# Patient Record
Sex: FEMALE | Race: WHITE
[De-identification: names, ages, dates, MRNs, and addresses within clinical notes are randomized per-mention and may not be internally consistent; named-entity substitution may affect disease eponyms.]

---

## 2017-07-28 ENCOUNTER — HOSPITAL ENCOUNTER (OUTPATIENT)
Dept: HOSPITAL 17 - PLAB | Age: 82
End: 2017-07-28
Attending: INTERNAL MEDICINE
Payer: MEDICARE

## 2017-07-28 DIAGNOSIS — I10: ICD-10-CM

## 2017-07-28 DIAGNOSIS — E78.2: ICD-10-CM

## 2017-07-28 DIAGNOSIS — Z79.899: ICD-10-CM

## 2017-07-28 DIAGNOSIS — I65.29: ICD-10-CM

## 2017-07-28 DIAGNOSIS — I31.9: ICD-10-CM

## 2017-07-28 DIAGNOSIS — R94.39: Primary | ICD-10-CM

## 2017-07-28 DIAGNOSIS — I36.9: ICD-10-CM

## 2017-07-28 DIAGNOSIS — I05.9: ICD-10-CM

## 2017-07-28 DIAGNOSIS — R00.0: ICD-10-CM

## 2017-07-28 LAB
ALP SERPL-CCNC: 55 U/L (ref 45–117)
ALT SERPL-CCNC: 18 U/L (ref 10–53)
AST SERPL-CCNC: 14 U/L (ref 15–37)
BILIRUB INDIRECT SERPL-MCNC: 0.5 MG/DL (ref 0–0.8)
BILIRUB SERPL-MCNC: 0.6 MG/DL (ref 0.2–1)
CK SERPL-CCNC: 31 U/L (ref 26–192)
HDLC SERPL-MCNC: 106.5 MG/DL (ref 40–60)
LDLC SERPL-MCNC: 173 MG/DL (ref 0–99)

## 2017-07-28 PROCEDURE — 80061 LIPID PANEL: CPT

## 2017-07-28 PROCEDURE — 80076 HEPATIC FUNCTION PANEL: CPT

## 2017-07-28 PROCEDURE — 36415 COLL VENOUS BLD VENIPUNCTURE: CPT

## 2017-07-28 PROCEDURE — 82550 ASSAY OF CK (CPK): CPT

## 2017-09-05 ENCOUNTER — HOSPITAL ENCOUNTER (OUTPATIENT)
Dept: HOSPITAL 17 - PLAB | Age: 82
End: 2017-09-05
Attending: FAMILY MEDICINE
Payer: MEDICARE

## 2017-09-05 DIAGNOSIS — E55.9: ICD-10-CM

## 2017-09-05 DIAGNOSIS — E78.2: Primary | ICD-10-CM

## 2017-09-05 LAB
ALP SERPL-CCNC: 49 U/L (ref 45–117)
ALT SERPL-CCNC: 19 U/L (ref 10–53)
ANION GAP SERPL CALC-SCNC: 6 MEQ/L (ref 5–15)
AST SERPL-CCNC: 9 U/L (ref 15–37)
BILIRUB SERPL-MCNC: 0.4 MG/DL (ref 0.2–1)
BUN SERPL-MCNC: 20 MG/DL (ref 7–18)
CHLORIDE SERPL-SCNC: 108 MEQ/L (ref 98–107)
GFR SERPLBLD BASED ON 1.73 SQ M-ARVRAT: 72 ML/MIN (ref 89–?)
HCO3 BLD-SCNC: 27.6 MEQ/L (ref 21–32)
HDLC SERPL-MCNC: 102.4 MG/DL (ref 40–60)
LDLC SERPL-MCNC: 102 MG/DL (ref 0–99)
POTASSIUM SERPL-SCNC: 4.2 MEQ/L (ref 3.5–5.1)
SODIUM SERPL-SCNC: 142 MEQ/L (ref 136–145)

## 2017-09-05 PROCEDURE — 36415 COLL VENOUS BLD VENIPUNCTURE: CPT

## 2017-09-05 PROCEDURE — 80053 COMPREHEN METABOLIC PANEL: CPT

## 2017-09-05 PROCEDURE — 80061 LIPID PANEL: CPT

## 2017-09-05 PROCEDURE — 82652 VIT D 1 25-DIHYDROXY: CPT

## 2017-10-02 ENCOUNTER — HOSPITAL ENCOUNTER (EMERGENCY)
Dept: HOSPITAL 17 - PHED | Age: 82
Discharge: HOME | End: 2017-10-02
Payer: MEDICARE

## 2017-10-02 VITALS
HEART RATE: 69 BPM | RESPIRATION RATE: 16 BRPM | DIASTOLIC BLOOD PRESSURE: 56 MMHG | OXYGEN SATURATION: 97 % | TEMPERATURE: 97.6 F | SYSTOLIC BLOOD PRESSURE: 122 MMHG

## 2017-10-02 VITALS — HEIGHT: 64 IN | WEIGHT: 112.44 LBS | BODY MASS INDEX: 19.2 KG/M2

## 2017-10-02 DIAGNOSIS — M54.31: Primary | ICD-10-CM

## 2017-10-02 DIAGNOSIS — I10: ICD-10-CM

## 2017-10-02 PROCEDURE — 99283 EMERGENCY DEPT VISIT LOW MDM: CPT

## 2017-10-02 NOTE — PD
HPI


Chief Complaint:  Musculoskeletal Complaint


Time Seen by Provider:  11:14


Travel History


International Travel<30 days:  No


Contact w/Intl Traveler<30days:  No


Traveled to known affect area:  No





History of Present Illness


HPI


The patient was seen and examined in the presence of the nurse.  She complains 

of right hip pain.  However it is actually her right buttock that is hurting.  

It's been hurting every day for 4 months.  She takes Advil for it which helps 

but she doesn't want to take Advil long-term.  She saw her primary physician 

for this who did x-rays.  She says they were normal.  She has no injury.  She 

is ambulatory.  Symptoms severity is mild to moderate





PFSH


Past Medical History


Hypertension:  Yes


Pregnant?:  Not Pregnant


Menopausal:  Yes





Past Surgical History


Surgical History:  No Previous Surgery


Hysterectomy:  Yes





Social History


Alcohol Use:  Yes (RARELY)


Tobacco Use:  No


Substance Use:  No





Allergies-Medications


(Allergen,Severity, Reaction):  


Coded Allergies:  


     No Known Allergies (Unverified , 10/2/17)


Reported Meds & Prescriptions





Reported Meds & Active Scripts


Active


Reported


Ibuprofen 400 Mg Tab 400 Mg PO TID


Vitamin D (Ergocalciferol (Vitamin D2)) 400 Unit Tablet 500 Mg PO DAILY


Atorvastatin (Atorvastatin Calcium) 10 Mg Tab 10 Mg PO HS


Verapamil ER 24 HR (Verapamil HCl) 240 Mg Tab 240 Mg PO HS


Levothyroxine (Levothyroxine Sodium) 75 Mcg Tab 75 Mcg PO DAILY


Potassium Chloride ER (Potassium Chloride) 8 Meq Tab 8 Meq PO DAILY


Hydrochlorothiazide 12.5 Mg Cap 12.5 Mg PO DAILY


Metoprolol Succinate ER 24 HR (Metoprolol Succinate) 25 Mg Tab 12.5 Mg PO DAILY


Premarin (Estrogens, Conjugated) 0.45 Mg Tab 0.45 Mg PO DAILY








Review of Systems


General / Constitutional:  No: Fever


HENT:  No: Headaches


Cardiovascular:  No: Chest Pain or Discomfort


Respiratory:  No: Cough





Physical Exam


Narrative


GASTROINTESTINAL:  Abdomen soft, non-tender, nondistended. Positive bowel 

sounds.  No hepato-splenomegaly, or palpable masses. No guarding.





NEUROLOGICAL: Awake and alert. Pupils are equal round and reactive. Motor and 

sensory grossly within normal limits. Five out of 5 muscle strength in all 

muscle groups.  Normal speech.





Right hip has good range of motion.  Right leg is no deformity or tenderness.





Data


Data


Last Documented VS





Vital Signs








  Date Time  Temp Pulse Resp B/P (MAP) Pulse Ox O2 Delivery O2 Flow Rate FiO2


 


10/2/17 10:58 97.6 69 16 122/56 (78) 97   











MDM


Medical Decision Making


Medical Screen Exam Complete:  Yes


Emergency Medical Condition:  Yes


Medical Record Reviewed:  Yes


Differential Diagnosis


Sciatica, soft tissue strain, dislocation


Narrative Course


I have reviewed the patient's electronic medical record.





Patient has chronic right hip pain with no objective findings.  She doesn't 

look like she is hurting now.  Her exam is normal





She reports had x-rays already.





Recommend primary care follow-up


I reviewed tramadol to use as breakthrough pain





Diagnosis





 Primary Impression:  


 Sciatica of right side





***Additional Instructions:  


The patient was advised to follow up with their physician and return if they 

worsen.


The patient was warned about potential sedation for the medications they will 

receive on prescription.


***Med/Other Pt SpecificInfo:  Prescription(s) given


Disposition:  01 DISCHARGE HOME


Condition:  Stable











Faraz Brown MD Oct 2, 2017 11:25

## 2017-11-22 ENCOUNTER — HOSPITAL ENCOUNTER (EMERGENCY)
Dept: HOSPITAL 17 - PHEFT | Age: 82
Discharge: HOME | End: 2017-11-22
Payer: MEDICARE

## 2017-11-22 VITALS
DIASTOLIC BLOOD PRESSURE: 65 MMHG | TEMPERATURE: 97.6 F | RESPIRATION RATE: 16 BRPM | HEART RATE: 67 BPM | SYSTOLIC BLOOD PRESSURE: 159 MMHG | OXYGEN SATURATION: 98 %

## 2017-11-22 VITALS — BODY MASS INDEX: 19.33 KG/M2 | WEIGHT: 114.64 LBS | HEIGHT: 64.5 IN

## 2017-11-22 DIAGNOSIS — G89.29: ICD-10-CM

## 2017-11-22 DIAGNOSIS — I10: ICD-10-CM

## 2017-11-22 DIAGNOSIS — E78.00: ICD-10-CM

## 2017-11-22 DIAGNOSIS — Z79.899: ICD-10-CM

## 2017-11-22 DIAGNOSIS — M25.551: Primary | ICD-10-CM

## 2017-11-22 PROCEDURE — 99284 EMERGENCY DEPT VISIT MOD MDM: CPT

## 2017-11-22 PROCEDURE — 96372 THER/PROPH/DIAG INJ SC/IM: CPT

## 2017-11-22 NOTE — PD
HPI


Chief Complaint:  Musculoskeletal Complaint


Time Seen by Provider:  13:15


Travel History


International Travel<30 days:  No


Contact w/Intl Traveler<30days:  No


Traveled to known affect area:  No





History of Present Illness


HPI


87-year-old female here with chronic right hip pain worse over the last 24 

hours.  She denies injury or trauma.  She reports she had a recent x-ray which 

was negative for fracture.  She reports this pain is similar to her chronic 

pain.  She describes the pain within the right hip joint which is constant, 

nonradiating.  She reports the pain is usually relieved with over-the-counter 

Motrin.  She has been seen in the past and received a shot in the ER which 

helped the pain.  She reports she came today because she didn't want to be in 

pain over the holiday.  She denies fever, chills, incontinence, saddle 

anesthesia, abdominal pain, paresthesia or weakness of the extremities.





PFSH


Past Medical History


High Cholesterol:  Yes


Diminished Hearing:  No


Hypertension:  Yes


Thyroid Disease:  Yes


Influenza Vaccination:  Yes


Pregnant?:  Not Pregnant


Menopausal:  Yes





Past Surgical History


Hysterectomy:  Yes





Social History


Alcohol Use:  Yes (RARELY)


Tobacco Use:  No


Substance Use:  No





Allergies-Medications


(Allergen,Severity, Reaction):  


Coded Allergies:  


     No Known Allergies (Unverified  Adverse Reaction, Unknown, 11/22/17)


Reported Meds & Prescriptions





Reported Meds & Active Scripts


Active


Ultram (Tramadol HCl) 50 Mg Tab 50 Mg PO Q6H PRN


Reported


Estradiol 0.5 Mg Tab 0.5 Mg PO DAILY


Vitamin D-1000 (Cholecalciferol) 1,000 Unit Tab 5,000 Units PO DAILY


Ibuprofen 400 Mg Tab 400 Mg PO TID


Atorvastatin (Atorvastatin Calcium) 10 Mg Tab 10 Mg PO HS


Verapamil ER 24 HR (Verapamil HCl) 240 Mg Tab 240 Mg PO HS


Levothyroxine (Levothyroxine Sodium) 75 Mcg Tab 75 Mcg PO DAILY


Potassium Chloride ER (Potassium Chloride) 8 Meq Tab 8 Meq PO DAILY


Hydrochlorothiazide 12.5 Mg Cap 12.5 Mg PO DAILY


Metoprolol Succinate ER 24 HR (Metoprolol Succinate) 25 Mg Tab 12.5 Mg PO DAILY








Review of Systems


Except as stated in HPI:  all other systems reviewed are Neg





Physical Exam


Narrative


GENERAL: Well-nourished, well-developed patient.


SKIN: Focused skin assessment warm/dry.


HEAD: Normocephalic.


EYES: No scleral icterus. No injection or drainage. 


NECK: Supple, trachea midline. No JVD or lymphadenopathy.


CARDIOVASCULAR: Regular rate and rhythm without murmurs, gallops, or rubs. 


RESPIRATORY: Breath sounds equal bilaterally. No accessory muscle use.


GASTROINTESTINAL: Abdomen soft, non-tender, nondistended. 


MUSCULOSKELETAL: No cyanosis, or edema.  Attention to the right lower 

extremity.TTP over the lateral aspect of the right hip.  Patient has no pain 

with flexion and external rotation of the hip.  2+ distal pulses.  Normal 

sensation.


BACK: Nontender without obvious deformity. No CVA tenderness.





Data


Data


Last Documented VS





Vital Signs








  Date Time  Temp Pulse Resp B/P (MAP) Pulse Ox O2 Delivery O2 Flow Rate FiO2


 


11/22/17 12:50 97.6 67 16 159/65 (96) 98   








Orders





 Orders


Ketorolac Inj (Toradol Inj) (11/22/17 13:30)








[facility]


Medical Decision Making


Medical Screen Exam Complete:  Yes


Emergency Medical Condition:  Yes


Differential Diagnosis


Arthralgia, right hip strain, right hip fracture


Narrative Course


87-year-old female here for evaluation of right hip pain.  She reports a 

history of chronic right hip pain over the last year.  She has had a recent 

negative x-ray.  She denies recent injury or trauma.  She reports she usually 

gets an injection when she has pain flares in the hip.  She has a normal 

physical exam.  He has full painless range of motion of the right hip.  She has 

tenderness to the lateral aspect.  Will be given a shot of Toradol in the ED 

and discharged home with a short dose of Ultram.





Diagnosis





 Primary Impression:  


 Hip pain, right


Referrals:  


Primary Care Physician





***Additional Instructions:  


Continue to take over-the-counter ibuprofen as needed for pain.


Take the Ultram as needed for pain that is unrelieved by the Motrin.


Stay well hydrated by drinking plenty of fluids and eat a high-fiber diet to 

avoid constipation.


Follow-up with her primary doctor


Scripts


Tramadol (Ultram) 50 Mg Tab


50 MG PO Q6H Y for PAIN, #15 TAB 0 Refills


   Prov: Skinny Keyes MD         11/22/17


Disposition:  01 DISCHARGE HOME


Condition:  Stable











Sangita Torrez Nov 22, 2017 13:24

## 2017-12-02 ENCOUNTER — HOSPITAL ENCOUNTER (EMERGENCY)
Dept: HOSPITAL 17 - PHEFT | Age: 82
Discharge: HOME | End: 2017-12-02
Payer: MEDICARE

## 2017-12-02 VITALS — RESPIRATION RATE: 20 BRPM | TEMPERATURE: 97.7 F | OXYGEN SATURATION: 95 % | HEART RATE: 72 BPM

## 2017-12-02 DIAGNOSIS — M19.90: Primary | ICD-10-CM

## 2017-12-02 PROCEDURE — 99283 EMERGENCY DEPT VISIT LOW MDM: CPT

## 2017-12-02 NOTE — PD
HPI


Chief Complaint:  Musculoskeletal Complaint


Time Seen by Provider:  13:13


Travel History


International Travel<30 days:  No


Contact w/Intl Traveler<30days:  No


Traveled to known affect area:  No





History of Present Illness


HPI


Patient is an 87-year-old female presents emergency department for evaluation 

of right hip pain for the past few months.  Patient states last she was here 

with this complaint she was given shot.  She states she's taken ibuprofen 

fairly frequently for her symptoms and does believe it.  She denies a history 

of trauma, states worsening when she bears weight, located in the right hip, no 

radiation.





PFSH


Past Medical History


High Cholesterol:  Yes


Diminished Hearing:  No


Hypertension:  Yes


Medical other:  Yes (OSTEOPOROSIS)


Thyroid Disease:  Yes


Tetanus Vaccination:  > 5 Years


Pregnant?:  Not Pregnant


Menopausal:  Yes





Past Surgical History


Hysterectomy:  Yes


Tonsillectomy:  Yes





Social History


Alcohol Use:  Yes (RARELY)


Tobacco Use:  No


Substance Use:  No





Allergies-Medications


(Allergen,Severity, Reaction):  


Coded Allergies:  


     No Known Allergies (Verified  Adverse Reaction, Unknown, 12/2/17)


Reported Meds & Prescriptions





Reported Meds & Active Scripts


Active


Ultram (Tramadol HCl) 50 Mg Tab 50 Mg PO Q6H PRN


Reported


Estradiol 0.5 Mg Tab 0.5 Mg PO DAILY


Vitamin D-1000 (Cholecalciferol) 1,000 Unit Tab 5,000 Units PO DAILY


Ibuprofen 400 Mg Tab 400 Mg PO TID


Atorvastatin (Atorvastatin Calcium) 10 Mg Tab 10 Mg PO HS


Verapamil ER 24 HR (Verapamil HCl) 240 Mg Tab 240 Mg PO HS


Levothyroxine (Levothyroxine Sodium) 75 Mcg Tab 75 Mcg PO DAILY


Potassium Chloride ER (Potassium Chloride) 8 Meq Tab 8 Meq PO DAILY


Hydrochlorothiazide 12.5 Mg Cap 12.5 Mg PO DAILY


Metoprolol Succinate ER 24 HR (Metoprolol Succinate) 25 Mg Tab 12.5 Mg PO DAILY








Review of Systems


Except as stated in HPI:  all other systems reviewed are Neg





Physical Exam


Narrative


GENERAL: Well-nourished, well-developed patient.


SKIN: Focused skin assessment warm/dry.


HEAD: Normocephalic.


EYES: No scleral icterus. No injection or drainage. 


NECK: Supple, trachea midline. No JVD or lymphadenopathy.


CARDIOVASCULAR: Regular rate and rhythm without murmurs, gallops, or rubs. 


RESPIRATORY: Breath sounds equal bilaterally. No accessory muscle use.


GASTROINTESTINAL: Abdomen soft, non-tender, nondistended. 


MUSCULOSKELETAL: No cyanosis, or edema.  No midline CT or L-spine tenderness, 

minimal tenderness of the right SI joint, this seems to be the source of her 

pain, straight leg raise negative, no tenderness at the hip, no tenderness with 

external and internal rotation.  No tenderness of the knees.  Pulses motor and 

sensory intact distally in all 4 extremities.  No visible signs of trauma.


BACK: Nontender without obvious deformity. No CVA tenderness.





Data


Data


Last Documented VS





Vital Signs








  Date Time  Temp Pulse Resp B/P (MAP) Pulse Ox O2 Delivery O2 Flow Rate FiO2


 


12/2/17 12:28 97.7 72 20  95   








Orders





 Orders


Ed Discharge Order (12/2/17 13:21)








MDM


Medical Decision Making


Medical Screen Exam Complete:  Yes


Emergency Medical Condition:  Yes


Differential Diagnosis


Sacroiliitis, osteoarthritis, acute fracture unlikely.


Narrative Course


Patient roomed in emergency department, her review of her records shows that 

her last shot was actually Toradol, she is unclear kidney function is taking 

Profen.  For this reason I'm disinclined to give her a shot of Toradol this 

time.  Recommended symptomatic management and follow-up with her primary care 

physician.  She stable for discharge





Diagnosis





 Primary Impression:  


 Arthritis of right sacroiliac joint


***Med/Other Pt SpecificInfo:  Prescription(s) given


Scripts


Tramadol (Ultram) 50 Mg Tab


50 MG PO Q6H Y for PAIN, #15 TAB 0 Refills


   Prov: Pablito Ambriz MD         12/2/17


Disposition:  01 DISCHARGE HOME


Condition:  Stable











Pablito Ambriz MD Dec 2, 2017 13:21

## 2018-03-09 ENCOUNTER — HOSPITAL ENCOUNTER (OUTPATIENT)
Dept: HOSPITAL 17 - PLAB | Age: 83
End: 2018-03-09
Attending: FAMILY MEDICINE
Payer: MEDICARE

## 2018-03-09 DIAGNOSIS — E78.2: Primary | ICD-10-CM

## 2018-03-09 DIAGNOSIS — E03.9: ICD-10-CM

## 2018-03-09 DIAGNOSIS — E55.9: ICD-10-CM

## 2018-03-09 LAB
ALBUMIN SERPL-MCNC: 3.4 GM/DL (ref 3.4–5)
ALP SERPL-CCNC: 62 U/L (ref 45–117)
ALT SERPL-CCNC: 14 U/L (ref 10–53)
AST SERPL-CCNC: 14 U/L (ref 15–37)
BILIRUB SERPL-MCNC: 0.5 MG/DL (ref 0.2–1)
BUN SERPL-MCNC: 17 MG/DL (ref 7–18)
CALCIUM SERPL-MCNC: 10 MG/DL (ref 8.5–10.1)
CHLORIDE SERPL-SCNC: 109 MEQ/L (ref 98–107)
CHOLEST SERPL-MCNC: 298 MG/DL (ref 120–200)
CHOLESTEROL/ HDL RATIO: 3.05 RATIO
CREAT SERPL-MCNC: 0.84 MG/DL (ref 0.5–1)
GFR SERPLBLD BASED ON 1.73 SQ M-ARVRAT: 64 ML/MIN (ref 89–?)
HCO3 BLD-SCNC: 28 MEQ/L (ref 21–32)
HDLC SERPL-MCNC: 97.5 MG/DL (ref 40–60)
LDLC SERPL-MCNC: 176 MG/DL (ref 0–99)
PROT SERPL-MCNC: 6.6 GM/DL (ref 6.4–8.2)
SODIUM SERPL-SCNC: 143 MEQ/L (ref 136–145)
TRIGL SERPL-MCNC: 122 MG/DL (ref 42–150)

## 2018-03-09 PROCEDURE — 82306 VITAMIN D 25 HYDROXY: CPT

## 2018-03-09 PROCEDURE — 80061 LIPID PANEL: CPT

## 2018-03-09 PROCEDURE — 36415 COLL VENOUS BLD VENIPUNCTURE: CPT

## 2018-03-09 PROCEDURE — 80053 COMPREHEN METABOLIC PANEL: CPT

## 2018-03-09 PROCEDURE — 84443 ASSAY THYROID STIM HORMONE: CPT

## 2018-06-14 ENCOUNTER — HOSPITAL ENCOUNTER (INPATIENT)
Dept: HOSPITAL 17 - NEPE | Age: 83
LOS: 3 days | Discharge: NURSING, REHAB OR LONG-TERM CARE | DRG: 470 | End: 2018-06-17
Attending: HOSPITALIST | Admitting: HOSPITALIST
Payer: MEDICARE

## 2018-06-14 VITALS
OXYGEN SATURATION: 98 % | DIASTOLIC BLOOD PRESSURE: 86 MMHG | HEART RATE: 89 BPM | TEMPERATURE: 98 F | RESPIRATION RATE: 16 BRPM | SYSTOLIC BLOOD PRESSURE: 167 MMHG

## 2018-06-14 VITALS
OXYGEN SATURATION: 98 % | TEMPERATURE: 97.8 F | DIASTOLIC BLOOD PRESSURE: 68 MMHG | RESPIRATION RATE: 16 BRPM | SYSTOLIC BLOOD PRESSURE: 117 MMHG | HEART RATE: 76 BPM

## 2018-06-14 VITALS
OXYGEN SATURATION: 94 % | TEMPERATURE: 98 F | RESPIRATION RATE: 16 BRPM | HEART RATE: 93 BPM | SYSTOLIC BLOOD PRESSURE: 202 MMHG | DIASTOLIC BLOOD PRESSURE: 86 MMHG

## 2018-06-14 VITALS — HEIGHT: 64 IN | BODY MASS INDEX: 21.42 KG/M2 | WEIGHT: 125.44 LBS

## 2018-06-14 DIAGNOSIS — I10: ICD-10-CM

## 2018-06-14 DIAGNOSIS — E03.9: ICD-10-CM

## 2018-06-14 DIAGNOSIS — E78.5: ICD-10-CM

## 2018-06-14 DIAGNOSIS — D72.829: ICD-10-CM

## 2018-06-14 DIAGNOSIS — J44.9: ICD-10-CM

## 2018-06-14 DIAGNOSIS — N28.9: ICD-10-CM
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
## 2018-06-14 DIAGNOSIS — S72.011A: Primary | ICD-10-CM

## 2018-06-14 LAB
BASOPHILS # BLD AUTO: 0 TH/MM3 (ref 0–0.2)
BASOPHILS NFR BLD: 0.1 % (ref 0–2)
BUN SERPL-MCNC: 25 MG/DL (ref 7–18)
CALCIUM SERPL-MCNC: 11.2 MG/DL (ref 8.5–10.1)
CHLORIDE SERPL-SCNC: 109 MEQ/L (ref 98–107)
CREAT SERPL-MCNC: 1.18 MG/DL (ref 0.5–1)
EOSINOPHIL # BLD: 0 TH/MM3 (ref 0–0.4)
EOSINOPHIL NFR BLD: 0 % (ref 0–4)
ERYTHROCYTE [DISTWIDTH] IN BLOOD BY AUTOMATED COUNT: 13.5 % (ref 11.6–17.2)
GFR SERPLBLD BASED ON 1.73 SQ M-ARVRAT: 43 ML/MIN (ref 89–?)
GLUCOSE SERPL-MCNC: 152 MG/DL (ref 74–106)
HCO3 BLD-SCNC: 22.7 MEQ/L (ref 21–32)
HCT VFR BLD CALC: 40.7 % (ref 35–46)
HGB BLD-MCNC: 13.4 GM/DL (ref 11.6–15.3)
INR PPP: 0.9 RATIO
LYMPHOCYTES # BLD AUTO: 0.6 TH/MM3 (ref 1–4.8)
LYMPHOCYTES NFR BLD AUTO: 3.1 % (ref 9–44)
MCH RBC QN AUTO: 29.5 PG (ref 27–34)
MCHC RBC AUTO-ENTMCNC: 32.9 % (ref 32–36)
MCV RBC AUTO: 89.6 FL (ref 80–100)
MONOCYTE #: 1.3 TH/MM3 (ref 0–0.9)
MONOCYTES NFR BLD: 6.7 % (ref 0–8)
NEUTROPHILS # BLD AUTO: 17.4 TH/MM3 (ref 1.8–7.7)
NEUTROPHILS NFR BLD AUTO: 90.1 % (ref 16–70)
PLATELET # BLD: 234 TH/MM3 (ref 150–450)
PMV BLD AUTO: 8.3 FL (ref 7–11)
PROTHROMBIN TIME: 9.5 SEC (ref 9.8–11.6)
RBC # BLD AUTO: 4.54 MIL/MM3 (ref 4–5.3)
SODIUM SERPL-SCNC: 142 MEQ/L (ref 136–145)
WBC # BLD AUTO: 19.4 TH/MM3 (ref 4–11)

## 2018-06-14 PROCEDURE — 81001 URINALYSIS AUTO W/SCOPE: CPT

## 2018-06-14 PROCEDURE — 80053 COMPREHEN METABOLIC PANEL: CPT

## 2018-06-14 PROCEDURE — 93005 ELECTROCARDIOGRAM TRACING: CPT

## 2018-06-14 PROCEDURE — 94150 VITAL CAPACITY TEST: CPT

## 2018-06-14 PROCEDURE — 71045 X-RAY EXAM CHEST 1 VIEW: CPT

## 2018-06-14 PROCEDURE — C1776 JOINT DEVICE (IMPLANTABLE): HCPCS

## 2018-06-14 PROCEDURE — 86900 BLOOD TYPING SEROLOGIC ABO: CPT

## 2018-06-14 PROCEDURE — 85730 THROMBOPLASTIN TIME PARTIAL: CPT

## 2018-06-14 PROCEDURE — 86850 RBC ANTIBODY SCREEN: CPT

## 2018-06-14 PROCEDURE — 72170 X-RAY EXAM OF PELVIS: CPT

## 2018-06-14 PROCEDURE — 85610 PROTHROMBIN TIME: CPT

## 2018-06-14 PROCEDURE — 83735 ASSAY OF MAGNESIUM: CPT

## 2018-06-14 PROCEDURE — 73502 X-RAY EXAM HIP UNI 2-3 VIEWS: CPT

## 2018-06-14 PROCEDURE — 96374 THER/PROPH/DIAG INJ IV PUSH: CPT

## 2018-06-14 PROCEDURE — 85025 COMPLETE CBC W/AUTO DIFF WBC: CPT

## 2018-06-14 PROCEDURE — 86901 BLOOD TYPING SEROLOGIC RH(D): CPT

## 2018-06-14 PROCEDURE — 80048 BASIC METABOLIC PNL TOTAL CA: CPT

## 2018-06-14 RX ADMIN — PHENYTOIN SODIUM SCH MLS/HR: 50 INJECTION INTRAMUSCULAR; INTRAVENOUS at 20:34

## 2018-06-14 RX ADMIN — HYDROCODONE BITARTRATE AND ACETAMINOPHEN PRN TAB: 5; 325 TABLET ORAL at 20:33

## 2018-06-14 RX ADMIN — PHENYTOIN SODIUM SCH MLS/HR: 50 INJECTION INTRAMUSCULAR; INTRAVENOUS at 19:43

## 2018-06-14 RX ADMIN — VERAPAMIL HYDROCHLORIDE SCH MG: 240 TABLET, FILM COATED, EXTENDED RELEASE ORAL at 20:32

## 2018-06-14 RX ADMIN — STANDARDIZED SENNA CONCENTRATE AND DOCUSATE SODIUM SCH TAB: 8.6; 5 TABLET, FILM COATED ORAL at 20:33

## 2018-06-14 RX ADMIN — Medication SCH ML: at 20:33

## 2018-06-14 NOTE — PD
Physical Exam


Date Seen by Provider:  Jun 14, 2018


Narrative


Patient presents with chief complaint of a right hip injury.  She had a fall 

earlier today injuring her hip.





Data


Data


Last Documented VS





Vital Signs








  Date Time  Temp Pulse Resp B/P (MAP) Pulse Ox O2 Delivery O2 Flow Rate FiO2


 


6/14/18 16:40  78 16  98 Room Air  


 


6/14/18 16:38 97.8   117/68 (84)    








Orders





 Orders


Hip, Uni(Ap&Lat) W Ap Pelvis (6/14/18 )


Iv Access Insert/Monitor (6/14/18 17:08)


Complete Blood Count With Diff (6/14/18 17:08)


Basic Metabolic Panel (Bmp) (6/14/18 17:08)


Coag Profile (6/14/18 17:08)


Morphine Inj (Morphine Inj) (6/14/18 17:30)








MDM


Supervised Visit with SIMRAN:  Yes


Narrative Course


I, Dr. Kraft, have reviewed the advance practice practitioner's documentation 

and am in agreement, met with the patient face to face, made the diagnosis, and 

the medical decision making was done by me.  





*My assessment and Findings: This patient is awake and alert.  Her right lower 

extremity is externally rotated and shortened.  She has tenderness in the right 

groin.  She is distally neurovascularly intact.





Please see Lin Zamudio DNP's  note for a more detailed H&P, final diagnosis 

and disposition











Nara Kraft MD Jun 14, 2018 17:47

## 2018-06-14 NOTE — EKG
Date Performed: 06/14/2018       Time Performed: 17:35:06

 

PTAGE:      88 years

 

EKG:      Sinus rhythm 

 

 NORMAL ECG

 

PREVIOUS TRACING       : 02/01/2015 08.11 No significant change from previous tracing noted.

 

DOCTOR:   Mario Doll  Interpretating Date/Time  06/14/2018 20:48:09

## 2018-06-14 NOTE — HHI.HP
__________________________________________________





[facility]


Service


[facility]


Primary Care Physician


Nikolay Andrade MD


Admission Diagnosis





R femoral neck fx


Diagnoses:  


(1) Fall


Diagnosis:  Principal





(2) Hip fracture, right


Diagnosis:  Principal





(3) Leukocytosis


Diagnosis:  Principal





(4) Renal insufficiency


Diagnosis:  Principal





Travel History


International Travel<30 Days:  No


Contact w/Intl Traveler <30 Da:  No


Traveled to Known Affected Are:  No


History of Present Illness


This is an 88-year-old female with a PMH of HTN, Hyperlipidemia and 

Hypothyroidism was brought to the ER by EMS secondary to complaints of right 

hip pain after a fall.  Patient states that she went to lock her car and 

stepped back at which time she lost her balance and fell onto the ground.  

Denies LOC or head trauma.  Reports significant pain at right hip, constant, 

severe, 10/10, nonradiating, worse with movement.  States  Morphine did improve 

symptoms.  On arrival, /68, HR 76, O2 sat 98% on RA, Afebrile.  WBC 19.4.

  Creatinine 1.18, previously 0.84 on 3/9/2018.  INR 0.9.  CXR with 

hyperinflation due to COPD, no infiltrate.  Hip X-ray displaced right femoral 

neck fracture.





Review of Systems


Except as stated in HPI:  all other systems reviewed are Neg


ROS: 14 point review of systems otherwise negative.





Past Family Social History


Past Medical History


PMH:  HTN, Hyperlipidemia and Hypothyroidism


Past Surgical History


PAST SURGICAL HISTORY: Cataract Surgery, Hysterectomy, Tonsillectomy


Allergies:  


Coded Allergies:  


     No Known Allergies (Verified  Adverse Reaction, Unknown, 17)


Family History


PAST FAMILY HISTORY:  Reviewed.  No h/o DM or CAD


Social History


PAST SOCIAL HISTORY: Occasional alcohol.  Negative for tobacco or drugs.





Physical Exam


Vital Signs





Vital Signs








  Date Time  Temp Pulse Resp B/P (MAP) Pulse Ox O2 Delivery O2 Flow Rate FiO2


 


18 18:20 98.0 89 16 167/86 (113) 98 Room Air  


 


18 17:47   16     


 


18 16:40  78 16  98 Room Air  


 


18 16:38 97.8 76 16 117/68 (84) 98   








Physical Exam


PE:


GENERAL: Extremely pleasant elderly white female in no acute distress.  [place] 


HEENT: PERRLA, EOMI. No scleral icterus or conjunctival pallor. No lid lag or 

facial droop.  


CARDIOVASCULAR: Regular rate and rhythm.  No obvious murmurs to auscultation. 

No chest tenderness to palpation. 


RESPIRATORY: No obvious rhonchi or wheezing. Clear to auscultation. Breath 

sounds equal bilaterally. 


GASTROINTESTINAL: Abdomen soft, non-tender, nondistended. BS normal. 


MUSCULOSKELETAL: Extremities without clubbing, cyanosis, or edema. No obvious 

deformities.  Decreased ROM of RLE due to injury. Pulses intact.


NEUROLOGICAL: Awake, alert and oriented x4. No focal neurologic deficits. 

Moving both upper and lower extremities spontaneously.


Laboratory





Laboratory Tests








Test


  18


17:31


 


White Blood Count 19.4 


 


Red Blood Count 4.54 


 


Hemoglobin 13.4 


 


Hematocrit 40.7 


 


Mean Corpuscular Volume 89.6 


 


Mean Corpuscular Hemoglobin 29.5 


 


Mean Corpuscular Hemoglobin


Concent 32.9 


 


 


Red Cell Distribution Width 13.5 


 


Platelet Count 234 


 


Mean Platelet Volume 8.3 


 


Neutrophils (%) (Auto) 90.1 


 


Lymphocytes (%) (Auto) 3.1 


 


Monocytes (%) (Auto) 6.7 


 


Eosinophils (%) (Auto) 0.0 


 


Basophils (%) (Auto) 0.1 


 


Neutrophils # (Auto) 17.4 


 


Lymphocytes # (Auto) 0.6 


 


Monocytes # (Auto) 1.3 


 


Eosinophils # (Auto) 0.0 


 


Basophils # (Auto) 0.0 


 


CBC Comment DIFF FINAL 


 


Differential Comment  


 


Prothrombin Time 9.5 


 


Prothromb Time International


Ratio 0.9 


 


 


Activated Partial


Thromboplast Time 22.6 


 


 


Blood Urea Nitrogen 25 


 


Creatinine 1.18 


 


Random Glucose 152 


 


Calcium Level 11.2 


 


Sodium Level 142 


 


Potassium Level 3.9 


 


Chloride Level 109 


 


Carbon Dioxide Level 22.7 


 


Anion Gap 10 


 


Estimat Glomerular Filtration


Rate 43 


 








Result Diagram:  


18 1731                                                                   

             18 1731








Caprini VTE Risk Assessment


Caprini VTE Risk Assessment:  Mod/High Risk (score >= 2)


Caprini Risk Assessment Model











 Point Value = 1          Point Value = 2  Point Value = 3  Point Value = 5


 


Age 41-60


Minor surgery


BMI > 25 kg/m2


Swollen legs


Varicose veins


Pregnancy or postpartum


History of unexplained or recurrent


   spontaneous 


Oral contraceptives or hormone


   replacement


Sepsis (< 1 month)


Serious lung disease, including


   pneumonia (< 1 month)


Abnormal pulmonary function


Acute myocardial infarction


Congestive heart failure (< 1 month)


History of inflammatory bowel disease


Medical patient at bed rest Age 61-74


Arthroscopic surgery


Major open surgery (> 45 min)


Laparoscopic surgery (> 45 min)


Malignancy


Confined to bed (> 72 hours)


Immobilizing plaster cast


Central venous access Age >= 75


History of VTE


Family history of VTE


Factor V Leiden


Prothrombin 45276M


Lupus anticoagulant


Anticardiolipin antibodies


Elevated serum homocysteine


Heparin-induced thrombocytopenia


Other congenital or acquired


   thrombophilia Stroke (< 1 month)


Elective arthroplasty


Hip, pelvis, or leg fracture


Acute spinal cord injury (< 1 month)








Prophylaxis Regimen











   Total Risk


Factor Score Risk Level Prophylaxis Regimen


 


0-1      Low Early ambulation


 


2 Moderate Order ONE of the following:


*Sequential Compression Device (SCD)


*Heparin 5000 units SQ BID


 


3-4 Higher Order ONE of the following medications:


*Heparin 5000 units SQ TID


*Enoxaparin/Lovenox 40 mg SQ daily (WT < 150 kg, CrCl > 30 mL/min)


*Enoxaparin/Lovenox 30 mg SQ daily (WT < 150 kg, CrCl > 10-29 mL/min)


*Enoxaparin/Lovenox 30 mg SQ BID (WT < 150 kg, CrCl > 30 mL/min)


AND/OR


*Sequential Compression Device (SCD)


 


5 or more Highest Order ONE of the following medications:


*Heparin 5000 units SQ TID (Preferred with Epidurals)


*Enoxaparin/Lovenox 40 mg SQ daily (WT < 150 kg, CrCl > 30 mL/min)


*Enoxaparin/Lovenox 30 mg SQ daily (WT < 150 kg, CrCl > 10-29 mL/min)


*Enoxaparin/Lovenox 30 mg SQ BID (WT < 150 kg, CrCl > 30 mL/min)


AND


*Sequential Compression Device (SCD)











Assessment and Plan


Problem List:  


(1) Fall


ICD Code:  W19.XXXA - Unspecified fall, initial encounter


(2) Hip fracture, right


ICD Code:  S72.001A - Fracture of unspecified part of neck of right femur, 

initial encounter for closed fracture


(3) Renal insufficiency


ICD Code:  N28.9 - Disorder of kidney and ureter, unspecified


(4) Leukocytosis


ICD Code:  D72.829 - Elevated white blood cell count, unspecified


Assessment and Plan


A/P:


1.  Fall:  s/p mechanical fall, no reported LOC or head trauma, no other 

injuries besides hip. 


2.  Right Hip Fx:  secondary to above, Hip X-ray w/ displaced right femoral 

neck fracture, images reviewed by me.  Consult Ortho for further evaluation/

intervention.  NPO after midnight, IVF, analgesics/antiemetics as needed.  


3.  Renal Insufficiency:  SHAYNE.  Creatinine 1.18, previously 0.84 on 3/9/2018.  

IVF for hydration, check UA to eval for underlying UTI.  Repeat labs in a.m.


4.  Leukocytosis:  WBC 19.4, likely related to fall/injury.  No obvious source 

of infection, CXR w/ no acute findings, images reviewed by me.  Check U/a as 

above to eval for possible UTI.  Repeat labs in am. 


5.  DVT Prophylaxis:  Anticoagulation post op


6.  Social work for d/c planning as needed


7.  Case discussed w/ ER physician at length, labs/records/imaging reviewed by 

me.





Physician Certification


2 Midnight Certification Type:  Admission for Inpatient Services


Order for Inpatient Services


The services are ordered in accordance with Medicare regulations or non-

Medicare payer requirements, as applicable.  In the case of services not 

specified as inpatient-only, they are appropriately provided as inpatient 

services in accordance with the 2-midnight benchmark.


Estimated LOS (days):  2


 days is the estimated time the patient will need to remain in the hospital, 

assuming treatment plan goals are met and no additional complications.


Post-Hospital Plan:  Not yet determined











Yolanda Gil MD 2018 19:16

## 2018-06-14 NOTE — RADRPT
EXAM DATE:  6/14/2018 5:59 PM EDT

AGE/SEX:        88 years / Female



INDICATIONS:  Fracture.



CLINICAL DATA:  This is the patient's initial encounter. Patient reports that signs and symptoms have
 been present for 1 day and indicates a pain score of 8/10. 

                                                                          

MEDICAL/SURGICAL HISTORY:       Arthritis. Hysterectomy.  Tonsillectomy.



COMPARISON:      No prior exams available for comparison. 





FINDINGS:  

Views of the right hip demonstrates displaced femoral neck fracture. Femoral head continues to articu
late with the acetabulum.



CONCLUSION: 

Displaced right femoral neck fracture



 







Electronically signed by: Faisal De La Torre MD  6/14/2018 6:17 PM EDT

## 2018-06-14 NOTE — RADRPT
EXAM DATE:  6/14/2018 6:51 PM EDT

AGE/SEX:        88 years / Female



INDICATIONS:  Pre op. Evaluate for pneumonia, pneumothorax, or communicable diseases.



CLINICAL DATA:  This is the patient's initial encounter. Patient reports that signs and symptoms have
 been present for 1 day and indicates a pain score of 0/10. 

                                                                          

MEDICAL/SURGICAL HISTORY:       None. None.



COMPARISON:      No prior exams available for comparison. 





FINDINGS:  

A single AP view of the chest demonstrates the lungs to be hyper aerated without evidence of mass, in
filtrate or effusion.  The cardiomediastinal contours are unremarkable.  Osseous structures are intac
t.  





CONCLUSION: 

Hyperinflation which can be seen with COPD. No infiltrate.



Electronically signed by: Faisal De La Torre MD  6/14/2018 6:58 PM EDT

## 2018-06-14 NOTE — PD
HPI


Chief Complaint:  Fall


Time Seen by Provider:  16:37


Travel History


International Travel<30 days:  No


Contact w/Intl Traveler<30days:  No


Traveled to known affect area:  No





History of Present Illness


HPI


88-year-old female with history of hypertension, thyroid disease, presents 

emergency department for evaluation of right hip pain.  Patient had a trip and 

fall this morning into her car.  She states she ended up on the ground on her 

right buttock.  She is unable to help herself up completely.  Throughout the 

day she has had right-sided hip pain.  It has been constant, throbbing, 

exacerbated with any movement or attempt to weight-bear.  She has been unable 

to weight-bear.  She denies any alterations in sensation.  She did not hit her 

head or lose consciousness.  Patient has no other symptoms to report at this 

time.





[facility]


Past Medical History


Cardiovascular Problems:  Yes (HBP)


High Cholesterol:  Yes


Diminished Hearing:  No


Hypertension:  Yes


Medical other:  Yes


Thyroid Disease:  Yes


Tetanus Vaccination:  > 5 Years


Influenza Vaccination:  Yes


Pregnant?:  Not Pregnant


Menopausal:  Yes


:  0





Past Surgical History


Eye Surgery:  Yes (CATARACTS)


Hysterectomy:  Yes


Tonsillectomy:  Yes





Social History


Alcohol Use:  Yes (RARELY)


Tobacco Use:  No


Substance Use:  No





Allergies-Medications


(Allergen,Severity, Reaction):  


Coded Allergies:  


     No Known Allergies (Verified  Adverse Reaction, Unknown, 17)


Reported Meds & Prescriptions





Reported Meds & Active Scripts


Active


Reported


Vitamin D-1000 (Cholecalciferol) 1,000 Unit Tab 5,000 Units PO DAILY


Ibuprofen 400 Mg Tab 400 Mg PO TID


Verapamil ER 24 HR (Verapamil HCl) 240 Mg Tab 240 Mg PO HS


Levothyroxine (Levothyroxine Sodium) 75 Mcg Tab 75 Mcg PO DAILY








Review of Systems


Except as stated in HPI:  all other systems reviewed are Neg





Physical Exam


Narrative


GENERAL: Well-nourished elderly female patient. she appears nontoxic and 

without distress.


SKIN: Focused skin assessment warm/dry.


HEAD: Atraumatic. Normocephalic. 


EYES: Pupils equal and round. No scleral icterus. No injection or drainage. 


ENT: No nasal bleeding or discharge.  Mucous membranes pink and moist.


NECK: Trachea midline. No JVD. 


CARDIOVASCULAR: Regular rate and rhythm.  2/6 systolic murmur appreciated.


RESPIRATORY: No accessory muscle use. Clear to auscultation. Breath sounds 

equal bilaterally. 


GASTROINTESTINAL: Abdomen soft, non-tender, nondistended. Hepatic and splenic 

margins not palpable. 


MUSCULOSKELETAL: Shortening and external rotation of the right distal lower 

extremity.  Distal pulses are palpable.  Cap refills within normal limits.  No 

clubbing.  No cyanosis.  No edema. 


NEUROLOGICAL: Awake and alert. No obvious cranial nerve deficits.  Motor 

grossly within normal limits excluding right lower extremity. Normal speech.


PSYCHIATRIC: Appropriate mood and affect; insight and judgment normal.





Data


Data


Last Documented VS





Vital Signs








  Date Time  Temp Pulse Resp B/P (MAP) Pulse Ox O2 Delivery O2 Flow Rate FiO2


 


18 17:47   16     


 


18 16:40  78   98 Room Air  


 


18 16:38 97.8   117/68 (84)    








Orders





 Orders


Hip, Uni(Ap&Lat) W Ap Pelvis (18 )


Iv Access Insert/Monitor (18 17:08)


Complete Blood Count With Diff (18 17:08)


Basic Metabolic Panel (Bmp) (18 17:08)


Coag Profile (18 17:08)


Morphine Inj (Morphine Inj) (18 17:30)


Chest, Single Ap (18 )


Electrocardiogram (18 )





Labs





Laboratory Tests








Test


  18


17:31


 


White Blood Count 19.4 TH/MM3 


 


Red Blood Count 4.54 MIL/MM3 


 


Hemoglobin 13.4 GM/DL 


 


Hematocrit 40.7 % 


 


Mean Corpuscular Volume 89.6 FL 


 


Mean Corpuscular Hemoglobin 29.5 PG 


 


Mean Corpuscular Hemoglobin


Concent 32.9 % 


 


 


Red Cell Distribution Width 13.5 % 


 


Platelet Count 234 TH/MM3 


 


Mean Platelet Volume 8.3 FL 


 


Neutrophils (%) (Auto) 90.1 % 


 


Lymphocytes (%) (Auto) 3.1 % 


 


Monocytes (%) (Auto) 6.7 % 


 


Eosinophils (%) (Auto) 0.0 % 


 


Basophils (%) (Auto) 0.1 % 


 


Neutrophils # (Auto) 17.4 TH/MM3 


 


Lymphocytes # (Auto) 0.6 TH/MM3 


 


Monocytes # (Auto) 1.3 TH/MM3 


 


Eosinophils # (Auto) 0.0 TH/MM3 


 


Basophils # (Auto) 0.0 TH/MM3 


 


CBC Comment DIFF FINAL 


 


Differential Comment  


 


Prothrombin Time 9.5 SEC 


 


Prothromb Time International


Ratio 0.9 RATIO 


 


 


Activated Partial


Thromboplast Time 22.6 SEC 


 


 


Blood Urea Nitrogen 25 MG/DL 


 


Creatinine 1.18 MG/DL 


 


Random Glucose 152 MG/DL 


 


Calcium Level 11.2 MG/DL 


 


Sodium Level 142 MEQ/L 


 


Potassium Level 3.9 MEQ/L 


 


Chloride Level 109 MEQ/L 


 


Carbon Dioxide Level 22.7 MEQ/L 


 


Anion Gap 10 MEQ/L 


 


Estimat Glomerular Filtration


Rate 43 ML/MIN 


 











MDM


Medical Decision Making


Medical Screen Exam Complete:  Yes


Emergency Medical Condition:  Yes


Medical Record Reviewed:  Yes


Differential Diagnosis


Fracture versus contusion versus sprain versus dislocation


Narrative Course


88-year-old female presents to the emergency department for evaluation right 

hip pain following a fall this morning.  Patient appears without distress.  Her 

vital signs are stable.  She does have a shortening and external rotation of 

the right lower extremity.  X-ray imaging confirms a femoral neck fracture.  

Preop labs, chest x-ray, and EKG are ordered.  Patient has a leukocytosis of 

19.4.  This could be stress reaction.  There is not any obvious infectious 

source at this time.  I discussed the patient with my attending physician who 

has reviewed the imaging studies and assess the patient.  Patient will be 

admitted to the hospitalist service.





Laboratory Tests








Test


  18


17:31


 


White Blood Count 19.4 TH/MM3 


 


Red Blood Count 4.54 MIL/MM3 


 


Hemoglobin 13.4 GM/DL 


 


Hematocrit 40.7 % 


 


Mean Corpuscular Volume 89.6 FL 


 


Mean Corpuscular Hemoglobin 29.5 PG 


 


Mean Corpuscular Hemoglobin


Concent 32.9 % 


 


 


Red Cell Distribution Width 13.5 % 


 


Platelet Count 234 TH/MM3 


 


Mean Platelet Volume 8.3 FL 


 


Neutrophils (%) (Auto) 90.1 % 


 


Lymphocytes (%) (Auto) 3.1 % 


 


Monocytes (%) (Auto) 6.7 % 


 


Eosinophils (%) (Auto) 0.0 % 


 


Basophils (%) (Auto) 0.1 % 


 


Neutrophils # (Auto) 17.4 TH/MM3 


 


Lymphocytes # (Auto) 0.6 TH/MM3 


 


Monocytes # (Auto) 1.3 TH/MM3 


 


Eosinophils # (Auto) 0.0 TH/MM3 


 


Basophils # (Auto) 0.0 TH/MM3 


 


CBC Comment DIFF FINAL 


 


Differential Comment  


 


Prothrombin Time 9.5 SEC 


 


Prothromb Time International


Ratio 0.9 RATIO 


 


 


Activated Partial


Thromboplast Time 22.6 SEC 


 


 


Blood Urea Nitrogen 25 MG/DL 


 


Creatinine 1.18 MG/DL 


 


Random Glucose 152 MG/DL 


 


Calcium Level 11.2 MG/DL 


 


Sodium Level 142 MEQ/L 


 


Potassium Level 3.9 MEQ/L 


 


Chloride Level 109 MEQ/L 


 


Carbon Dioxide Level 22.7 MEQ/L 


 


Anion Gap 10 MEQ/L 


 


Estimat Glomerular Filtration


Rate 43 ML/MIN 


 








Last Impressions








Hip and Pelvis X-Ray 18 0000 Signed





Impressions: 





 CONCLUSION: 





 Displaced right femoral neck fracture





  





 





  





  





 





  





 





  





 











Diagnosis





 Primary Impression:  


 Closed right hip fracture


 Qualified Codes:  S72.001A - Fracture of unspecified part of neck of right 

femur, initial encounter for closed fracture





Admitting Information


Admitting Physician Requests:  Admit


Condition:  Stable











Lin Zamudio 2018 17:38

## 2018-06-15 VITALS
RESPIRATION RATE: 16 BRPM | OXYGEN SATURATION: 96 % | HEART RATE: 77 BPM | TEMPERATURE: 97.5 F | SYSTOLIC BLOOD PRESSURE: 144 MMHG | DIASTOLIC BLOOD PRESSURE: 63 MMHG

## 2018-06-15 VITALS
TEMPERATURE: 97.9 F | OXYGEN SATURATION: 95 % | RESPIRATION RATE: 16 BRPM | DIASTOLIC BLOOD PRESSURE: 88 MMHG | HEART RATE: 92 BPM | SYSTOLIC BLOOD PRESSURE: 215 MMHG

## 2018-06-15 VITALS
SYSTOLIC BLOOD PRESSURE: 183 MMHG | HEART RATE: 85 BPM | RESPIRATION RATE: 16 BRPM | TEMPERATURE: 98 F | DIASTOLIC BLOOD PRESSURE: 74 MMHG | OXYGEN SATURATION: 95 %

## 2018-06-15 VITALS
OXYGEN SATURATION: 98 % | TEMPERATURE: 97.2 F | SYSTOLIC BLOOD PRESSURE: 177 MMHG | HEART RATE: 82 BPM | RESPIRATION RATE: 17 BRPM | DIASTOLIC BLOOD PRESSURE: 73 MMHG

## 2018-06-15 VITALS
TEMPERATURE: 97.9 F | OXYGEN SATURATION: 95 % | RESPIRATION RATE: 16 BRPM | DIASTOLIC BLOOD PRESSURE: 81 MMHG | HEART RATE: 92 BPM | SYSTOLIC BLOOD PRESSURE: 189 MMHG

## 2018-06-15 LAB
ALBUMIN SERPL-MCNC: 3.1 GM/DL (ref 3.4–5)
ALP SERPL-CCNC: 61 U/L (ref 45–117)
ALT SERPL-CCNC: 20 U/L (ref 10–53)
AST SERPL-CCNC: 14 U/L (ref 15–37)
BASOPHILS # BLD AUTO: 0.1 TH/MM3 (ref 0–0.2)
BASOPHILS NFR BLD: 0.3 % (ref 0–2)
BILIRUB SERPL-MCNC: 0.5 MG/DL (ref 0.2–1)
BUN SERPL-MCNC: 19 MG/DL (ref 7–18)
CALCIUM SERPL-MCNC: 9.6 MG/DL (ref 8.5–10.1)
CHLORIDE SERPL-SCNC: 109 MEQ/L (ref 98–107)
COLOR UR: YELLOW
CREAT SERPL-MCNC: 0.75 MG/DL (ref 0.5–1)
EOSINOPHIL # BLD: 0 TH/MM3 (ref 0–0.4)
EOSINOPHIL NFR BLD: 0 % (ref 0–4)
ERYTHROCYTE [DISTWIDTH] IN BLOOD BY AUTOMATED COUNT: 13.3 % (ref 11.6–17.2)
GFR SERPLBLD BASED ON 1.73 SQ M-ARVRAT: 73 ML/MIN (ref 89–?)
GLUCOSE SERPL-MCNC: 112 MG/DL (ref 74–106)
GLUCOSE UR STRIP-MCNC: 50 MG/DL
HCO3 BLD-SCNC: 23.7 MEQ/L (ref 21–32)
HCT VFR BLD CALC: 35.6 % (ref 35–46)
HGB BLD-MCNC: 11.9 GM/DL (ref 11.6–15.3)
HGB UR QL STRIP: (no result)
HYALINE CASTS #/AREA URNS LPF: 3 /LPF
KETONES UR STRIP-MCNC: (no result) MG/DL
LYMPHOCYTES # BLD AUTO: 0.7 TH/MM3 (ref 1–4.8)
LYMPHOCYTES NFR BLD AUTO: 4.2 % (ref 9–44)
MCH RBC QN AUTO: 29.8 PG (ref 27–34)
MCHC RBC AUTO-ENTMCNC: 33.4 % (ref 32–36)
MCV RBC AUTO: 89.2 FL (ref 80–100)
MONOCYTE #: 1.2 TH/MM3 (ref 0–0.9)
MONOCYTES NFR BLD: 7.4 % (ref 0–8)
NEUTROPHILS # BLD AUTO: 13.9 TH/MM3 (ref 1.8–7.7)
NEUTROPHILS NFR BLD AUTO: 88.1 % (ref 16–70)
NITRITE UR QL STRIP: (no result)
PLATELET # BLD: 212 TH/MM3 (ref 150–450)
PMV BLD AUTO: 8.7 FL (ref 7–11)
PROT SERPL-MCNC: 6.1 GM/DL (ref 6.4–8.2)
RBC # BLD AUTO: 3.99 MIL/MM3 (ref 4–5.3)
SODIUM SERPL-SCNC: 142 MEQ/L (ref 136–145)
SP GR UR STRIP: 1.01 (ref 1–1.03)
URINE LEUKOCYTE ESTERASE: (no result)
WBC # BLD AUTO: 15.8 TH/MM3 (ref 4–11)

## 2018-06-15 PROCEDURE — 0SRR0JZ REPLACEMENT OF RIGHT HIP JOINT, FEMORAL SURFACE WITH SYNTHETIC SUBSTITUTE, OPEN APPROACH: ICD-10-PCS | Performed by: ORTHOPAEDIC SURGERY

## 2018-06-15 RX ADMIN — PHENYTOIN SODIUM SCH MLS/HR: 50 INJECTION INTRAMUSCULAR; INTRAVENOUS at 20:19

## 2018-06-15 RX ADMIN — HYDROCODONE BITARTRATE AND ACETAMINOPHEN PRN TAB: 5; 325 TABLET ORAL at 10:15

## 2018-06-15 RX ADMIN — Medication SCH ML: at 20:19

## 2018-06-15 RX ADMIN — VERAPAMIL HYDROCHLORIDE SCH MG: 240 TABLET, FILM COATED, EXTENDED RELEASE ORAL at 20:18

## 2018-06-15 RX ADMIN — Medication SCH ML: at 09:00

## 2018-06-15 RX ADMIN — HYDROCODONE BITARTRATE AND ACETAMINOPHEN PRN TAB: 5; 325 TABLET ORAL at 04:47

## 2018-06-15 RX ADMIN — HYDROCODONE BITARTRATE AND ACETAMINOPHEN PRN TAB: 5; 325 TABLET ORAL at 20:18

## 2018-06-15 RX ADMIN — STANDARDIZED SENNA CONCENTRATE AND DOCUSATE SODIUM SCH TAB: 8.6; 5 TABLET, FILM COATED ORAL at 20:18

## 2018-06-15 RX ADMIN — STANDARDIZED SENNA CONCENTRATE AND DOCUSATE SODIUM SCH TAB: 8.6; 5 TABLET, FILM COATED ORAL at 10:11

## 2018-06-15 RX ADMIN — LEVOTHYROXINE SODIUM SCH MCG: 75 TABLET ORAL at 04:48

## 2018-06-15 RX ADMIN — PHENYTOIN SODIUM SCH MLS/HR: 50 INJECTION INTRAMUSCULAR; INTRAVENOUS at 04:50

## 2018-06-15 NOTE — PD.ORT.PN
Subjective


Subjective Remarks


POD#0 s/p R hip hemiarthroplasty





Objective


Vitals





Vital Signs








  Date Time  Temp Pulse Resp B/P (MAP) Pulse Ox O2 Delivery O2 Flow Rate FiO2


 


6/15/18 16:15 97.1 82 16 159/73 (101) 97 Nasal Cannula 2 


 


6/15/18 16:00 97.1 76 16 158/73 (101) 98 Nasal Cannula 2 


 


6/15/18 15:45 97.1 80 16 168/76 (106) 100 Nasal Cannula 2 


 


6/15/18 15:40 97.1 82 16 172/73 (106) 100 Nasal Cannula 2 


 


6/15/18 08:00 97.5 77 16 144/63 (90) 96   


 


6/15/18 04:00 97.9 92 16 189/81 (117) 95   


 


6/15/18 00:00 97.9 92 16 215/88 (130) 95   


 


6/14/18 20:35        


 


6/14/18 20:00 98.0 93 16 202/86 (124) 94   


 


6/14/18 18:20 98.0 89 16 167/86 (113) 98 Room Air  


 


6/14/18 17:47   16     














I/O      


 


 6/14/18 6/14/18 6/14/18 6/15/18 6/15/18 6/15/18





 07:00 15:00 23:00 07:00 15:00 23:00


 


Intake Total    1340 ml  900 ml


 


Output Total      125 ml


 


Balance    1340 ml  775 ml


 


      


 


Intake Oral    240 ml  


 


IV Total    1100 ml  


 


Other      900 ml


 


Output Estimated Blood Loss      125 ml


 


# Voids    2  








Result Diagram:  


6/15/18 0552                                                                   

             6/15/18 0552





Other Results





Laboratory Tests








Test


  6/14/18


17:31


 


Prothromb Time International


Ratio 0.9 RATIO 


 


 


Prothrombin Time


  9.5 SEC


(9.8-11.6)








Imaging





Last 24 hours Impressions








Pelvis X-Ray 6/15/18 0000 Signed





Impressions: 





 CONCLUSION:





    Right-sided total hip arthroplasty.





  





 








Objective Remarks


Right lower extremity: Dressing in place.  Neurovascularly intact distally.  

Negative Homans.  Brisk cap refill.





Assessment & Plan


Assessment and Plan


88-year-old female, POD#0 s/p R hip hemiarthroplasty





1.  Weightbearing as tolerated right lower extremity.  Anterolateral hip 

precautions.  Knee immobilizer and abduction pillow in bed.


2.  Physical therapy for mobilization.


3.  Lovenox for DVT prophylaxis


4.  Likely will require placement upon discharge.  Follow-up in with Dr. Taylor 

in 2 weeks.











Krista Taylor MD Jewel 15, 2018 17:29

## 2018-06-15 NOTE — RADRPT
EXAM DATE:  6/15/2018 4:39 PM EDT

AGE/SEX:        88 years / Female



INDICATIONS:  Post op Right hip surgery.



CLINICAL DATA:  This is the patient's initial encounter. Patient reports that signs and symptoms have
 been present for 1 day and indicates a pain score of Nonresponsive. 

                                                                          

MEDICAL/SURGICAL HISTORY:       Arthritis. . Hysterectomy. Tonsillectomy



COMPARISON:      No prior exams available for comparison. 





FINDINGS:  

Examination of the pelvis demonstrates a right hip prosthesis. This is in good position on this singl
e projection. No fracture or dislocation observed. Air within the overlying soft tissues. Surgical st
aples noted. Osteopenia and in left hypoxia arthritis.



CONCLUSION:

   Right-sided total hip arthroplasty.



Electronically signed by: Thom Dove MD  6/15/2018 4:40 PM EDT

## 2018-06-15 NOTE — HHI.PR
Subjective


Remarks


Follow-up right hip fracture


Natali 15, 2018-patient seen and examined; complaints of right hip pain.  

Currently n.p.o.





Objective


Vitals





Vital Signs








  Date Time  Temp Pulse Resp B/P (MAP) Pulse Ox O2 Delivery O2 Flow Rate FiO2


 


6/15/18 04:00 97.9 92 16 189/81 (117) 95   


 


6/15/18 00:00 97.9 92 16 215/88 (130) 95   


 


6/14/18 20:35        


 


6/14/18 20:00 98.0 93 16 202/86 (124) 94   


 


6/14/18 18:20 98.0 89 16 167/86 (113) 98 Room Air  


 


6/14/18 17:47   16     


 


6/14/18 16:40  78 16  98 Room Air  


 


6/14/18 16:38 97.8 76 16 117/68 (84) 98   














I/O      


 


 6/14/18 6/14/18 6/14/18 6/15/18 6/15/18 6/15/18





 07:00 15:00 23:00 07:00 15:00 23:00


 


Intake Total    1340 ml  


 


Balance    1340 ml  


 


      


 


Intake Oral    240 ml  


 


IV Total    1100 ml  


 


# Voids    2  








Result Diagram:  


6/15/18 0552                                                                   

             6/15/18 0552





Imaging





Last Impressions








Hip and Pelvis X-Ray 6/14/18 0000 Signed





Impressions: 





 CONCLUSION: 





 Displaced right femoral neck fracture





  





 





  





  





 





  





 





  





 


 


Chest X-Ray 6/14/18 0000 Signed





Impressions: 





 CONCLUSION: 





 Hyperinflation which can be seen with COPD. No infiltrate.





  





 








Objective Remarks


GENERAL: NAD


SKIN: Warm and dry.


HEAD: Normocephalic.


EYES: No scleral icterus. No injection or drainage. 


NECK: Supple, trachea midline. No JVD or lymphadenopathy.


CARDIOVASCULAR: Regular rate and rhythm without murmurs, gallops, or rubs. 


RESPIRATORY: Breath sounds equal bilaterally. No accessory muscle use.


GASTROINTESTINAL: Abdomen soft, non-tender, nondistended. 


MUSCULOSKELETAL: No cyanosis, or edema. Right LE with limited ROM; TTP at the 

hip joint


BACK: Nontender without obvious deformity. No CVA tenderness.








A/P


Problem List:  


(1) Fall


ICD Code:  W19.XXXA - Unspecified fall, initial encounter


(2) Hip fracture, right


ICD Code:  S72.001A - Fracture of unspecified part of neck of right femur, 

initial encounter for closed fracture


(3) Renal insufficiency


ICD Code:  N28.9 - Disorder of kidney and ureter, unspecified


(4) Leukocytosis


ICD Code:  D72.829 - Elevated white blood cell count, unspecified


Assessment and Plan


88-year-old female with





1.  Right Hip Fx:  secondary to above, Hip X-ray w/ displaced right femoral 

neck fracture.  Ortho consultation pending for repair .  NPO , IVF, analgesics/

antiemetics as needed. 


2.  Fall:  s/p mechanical fall


3.  Renal Insufficiency:  SHAYNE.  Improving with IV fluid hydration


4.  Leukocytosis: likely related to fall/injury.  No obvious source of infection

, CXR w/ no acute findings. 


5.  DVT Prophylaxis:  Anticoagulation post op











Faisal Zheng MD Jewel 15, 2018 12:20

## 2018-06-15 NOTE — PD.CONS
__________________________________________________





HPI


Service


Orthopedic Surgeons


Consult Requested By





Reason for Consult


Closed right femoral neck fracture


Primary Care Physician


Nikolay Andrade MD


Admission Diagnosis





R femoral neck fx


Diagnoses:  


(1) Fall


Diagnosis:  Secondary





(2) Hip fracture, right


Diagnosis:  Principal





(3) Renal insufficiency


Diagnosis:  Secondary





(4) Leukocytosis


Diagnosis:  Secondary





Chief Complaint:  


Right hip pain


History of Present Illness


88-year-old female with a PMH of HTN, Hyperlipidemia and Hypothyroidism was 

brought to the ER by EMS secondary to complaints of right hip pain after a 

fall.  Patient states that she went to lock her car and stepped back at which 

time she lost her balance and fell onto the ground.  Denies LOC or head trauma.

  Reports significant pain at right hip, constant, severe, 10/10, nonradiating, 

worse with movement.  Denies any other extremity injury.  She states she walks 

with a walker occasionally and has chronic baseline right hip weakness.





Review of Systems


Constitutional:  DENIES: Fever


Endocrine:  DENIES: Heat/cold intolerance


Eyes:  DENIES: Blurred vision


Ears, nose, mouth, throat:  DENIES: Throat pain


Respiratory:  DENIES: Cough


Cardiovascular:  DENIES: Chest pain


Gastrointestinal:  DENIES: Abdominal pain


Genitourinary:  DENIES: Urinary incontinence


Musculoskeletal:  COMPLAINS OF: Joint pain, Joint Swelling


Integumentary:  DENIES: Rash


Hematologic/lymphatic:  DENIES: Bruising


Immunologic/allergic:  DENIES: Eczema


Neurologic:  DENIES: Abnormal gait


Psychiatric:  DENIES: Anxiety





Past Family Social History


Past Medical History


PMH:  HTN, Hyperlipidemia and Hypothyroidism


Past Surgical History


PAST SURGICAL HISTORY: Cataract Surgery, Hysterectomy, Tonsillectomy


Reported Medications


Please see full chart.  Denies any anticoagulants


Allergies:  


Coded Allergies:  


     No Known Allergies (Verified  Adverse Reaction, Unknown, 12/2/17)


Active Ordered Medications





Current Medications








 Medications


  (Trade)  Dose


 Ordered  Sig/Aleks


 Route  Start Time


 Stop Time Status Last Admin


 


 Sodium Chloride  1,000 ml @ 


 100 mls/hr  Q10H


 IV  6/14/18 19:13


    6/15/18 04:50


 


 


  (NS Flush)  2 ml  UNSCH  PRN


 IV FLUSH  6/14/18 19:15


     


 


 


  (NS Flush)  2 ml  BID


 IV FLUSH  6/14/18 21:00


     


 


 


  (Reglan Inj)  5 mg  Q6H  PRN


 IV PUSH  6/14/18 19:15


     


 


 


  (Tylenol)  650 mg  Q6H  PRN


 PO  6/14/18 19:15


     


 


 


  (Norco  5-325 Mg)  1 tab  Q4H  PRN


 PO  6/14/18 19:15


    6/15/18 10:15


 


 


  (Nicolette-Colace)  1 tab  BID


 PO  6/14/18 21:00


    6/15/18 10:11


 


 


  (Milk Of


 Magnesia Liq)  30 ml  Q12H  PRN


 PO  6/14/18 19:15


     


 


 


  (Senokot)  17.2 mg  Q12H  PRN


 PO  6/14/18 19:15


     


 


 


  (Dulcolax Supp)  10 mg  DAILY  PRN


 RECTAL  6/14/18 19:15


     


 


 


  (Lactulose Liq)  30 ml  DAILY  PRN


 PO  6/14/18 19:15


     


 


 


  (Synthroid)  75 mcg  DAILY@0600


 PO  6/15/18 06:00


    6/15/18 04:48


 


 


  (Isoptin  Sr)  240 mg  HS


 PO  6/14/18 21:00


    6/14/18 20:32


 


 


  (Morphine Inj)  2 mg  Q3H  PRN


 IV PUSH  6/15/18 00:45


    6/15/18 00:51


 


 


 Lactated Ringer's  1,000 ml @ 


 30 mls/hr  Q24H PRN


 IV  6/15/18 02:00


 6/18/18 01:59   


 


 


 Sodium Chloride  500 ml @ 


 30 mls/hr  [id number] PRN


 IV  6/15/18 02:00


 6/18/18 01:59   


 


 


  (Betadine 5%


 Antisepsis Kit)  1 applic  ON CALL  PRN


 EACH NARE  6/15/18 02:00


 6/18/18 01:59   


 


 


  (Chlorhexidine


 2% Cloth)  3 pack  ON CALL  PRN


 TOPICAL  6/15/18 02:00


 6/18/18 01:59   


 


 


  (NS Flush)  2 ml  UNSCH  PRN


 IV FLUSH  6/15/18 15:30


     


 


 


  (NS Flush)  2 ml  BID


 IV FLUSH  6/15/18 21:00


     


 


 


 Cefazolin Sodium


 1000 mg/Sodium


 Chloride  100 ml @ 


 200 mls/hr  Q6H


 IV  6/15/18 15:30


 6/16/18 03:59 UNV  


 


 


  (Misc Post-op


 Orders (for


 Pharmacy))    STAT  ONCE


 XX  6/15/18 15:30


 6/15/18 15:31   


 


 


  (Lovenox Inj)  40 mg  Q24H


 SQ  6/15/18 15:30


   UNV  


 


 


  (Theragran M Tab)  1 tab  BID


 PO  6/16/18 21:00


 8/15/18 20:59   


 








Reported Meds & Active Scripts


Active


Reported


Vitamin D-1000 (Cholecalciferol) 1,000 Unit Tab 5,000 Units PO DAILY


Ibuprofen 400 Mg Tab 400 Mg PO TID


Verapamil ER 24 HR (Verapamil HCl) 240 Mg Tab 240 Mg PO HS


Levothyroxine (Levothyroxine Sodium) 75 Mcg Tab 75 Mcg PO DAILY


Family History


PAST FAMILY HISTORY:  Reviewed.  No h/o DM or CAD


Social History


PAST SOCIAL HISTORY: Occasional alcohol.  Negative for tobacco or drugs.





Physical Exam


Vital Signs





Vital Signs








  Date Time  Temp Pulse Resp B/P (MAP) Pulse Ox O2 Delivery O2 Flow Rate FiO2


 


6/15/18 08:00 97.5 77 16 144/63 (90) 96   


 


6/15/18 04:00 97.9 92 16 189/81 (117) 95   


 


6/15/18 00:00 97.9 92 16 215/88 (130) 95   


 


6/14/18 20:35        


 


6/14/18 20:00 98.0 93 16 202/86 (124) 94   


 


6/14/18 18:20 98.0 89 16 167/86 (113) 98 Room Air  


 


6/14/18 17:47   16     


 


6/14/18 16:40  78 16  98 Room Air  


 


6/14/18 16:38 97.8 76 16 117/68 (84) 98   








Physical Exam


Awake, alert, no acute distress


Normocephalic


Pupils equal


No JVD


Moist mucous membranes


Soft nontender abdomen


Regular rate


Nonlabored respirations


Right lower extremity: Positive logroll.  Unable to assess range of motion due 

to discomfort.  Patient is neurovascular intact distally.  Brisk cap refill.  

Sensation intact.


Bilateral upper extremities and left lower extremity: No tenderness palpation 

of visible deformities.  Full active range of motion and strength throughout.  

Sensation intact.  Brisk cap refill.


No rash


Normal affect


Laboratory





Laboratory Tests








Test


  6/14/18


17:31 6/15/18


01:10 6/15/18


05:52


 


White Blood Count 19.4   15.8 


 


Red Blood Count 4.54   3.99 


 


Hemoglobin 13.4   11.9 


 


Hematocrit 40.7   35.6 


 


Mean Corpuscular Volume 89.6   89.2 


 


Mean Corpuscular Hemoglobin 29.5   29.8 


 


Mean Corpuscular Hemoglobin


Concent 32.9 


  


  33.4 


 


 


Red Cell Distribution Width 13.5   13.3 


 


Platelet Count 234   212 


 


Mean Platelet Volume 8.3   8.7 


 


Neutrophils (%) (Auto) 90.1   88.1 


 


Lymphocytes (%) (Auto) 3.1   4.2 


 


Monocytes (%) (Auto) 6.7   7.4 


 


Eosinophils (%) (Auto) 0.0   0.0 


 


Basophils (%) (Auto) 0.1   0.3 


 


Neutrophils # (Auto) 17.4   13.9 


 


Lymphocytes # (Auto) 0.6   0.7 


 


Monocytes # (Auto) 1.3   1.2 


 


Eosinophils # (Auto) 0.0   0.0 


 


Basophils # (Auto) 0.0   0.1 


 


CBC Comment DIFF FINAL   DIFF FINAL 


 


Differential Comment     


 


Prothrombin Time 9.5   


 


Prothromb Time International


Ratio 0.9 


  


  


 


 


Activated Partial


Thromboplast Time 22.6 


  


  


 


 


Blood Urea Nitrogen 25   19 


 


Creatinine 1.18   0.75 


 


Random Glucose 152   112 


 


Calcium Level 11.2   9.6 


 


Sodium Level 142   142 


 


Potassium Level 3.9   4.3 


 


Chloride Level 109   109 


 


Carbon Dioxide Level 22.7   23.7 


 


Anion Gap 10   9 


 


Estimat Glomerular Filtration


Rate 43 


  


  73 


 


 


Urine Color  YELLOW  


 


Urine Turbidity  CLEAR  


 


Urine pH  6.0  


 


Urine Specific Gravity  1.013  


 


Urine Protein  30  


 


Urine Glucose (UA)  50  


 


Urine Ketones  TRACE  


 


Urine Occult Blood  NEG  


 


Urine Nitrite  NEG  


 


Urine Bilirubin  NEG  


 


Urine Leukocyte Esterase  NEG  


 


Urine RBC  1  


 


Urine WBC  3  


 


Urine Hyaline Casts  3  


 


Microscopic Urinalysis Comment


  


  CULT NOT


INDICATED 


 


 


Total Protein   6.1 


 


Albumin   3.1 


 


Alkaline Phosphatase   61 


 


Aspartate Amino Transf


(AST/SGOT) 


  


  14 


 


 


Alanine Aminotransferase


(ALT/SGPT) 


  


  20 


 


 


Total Bilirubin   0.5 








Result Diagram:  


6/15/18 0552                                                                   

             6/15/18 0552





Imaging





Last 48 hours Impressions








Hip and Pelvis X-Ray 6/14/18 0000 Signed





Impressions: 





 CONCLUSION: 





 Displaced right femoral neck fracture





  





 





  





  





 





  





 





  





 


 


Chest X-Ray 6/14/18 0000 Signed





Impressions: 





 CONCLUSION: 





 Hyperinflation which can be seen with COPD. No infiltrate.





  





 











Assessment & Plan


Assessment and Plan


88-year-old female with closed right subcapital displaced femoral neck fracture.





Options of management were discussed with the patient.  Given she has a 

displaced right subcapital femoral neck fracture, I recommended operative 

intervention in the form of right hip hemiarthroplasty.  Risks surgery 

including but not limited to: Infection, hardware malposition or failure, 

periprosthetic fracture, neurovascular injury, persistent hip pain, weakness and

/or stiffness, possible need for further surgery, hip instability including 

possible dislocation, and other unforeseen complications were all discussed 

with the patient.  At this time she has consented to the procedure.  Patient is 

n.p.o. since midnight with plan for surgery today.











Krista Taylor MD Jewel 15, 2018 15:36

## 2018-06-15 NOTE — HHI.PR
cc:   Krista Taylor MD


__________________________________________________





Immediate Post Op Note


Procedure Date:


Jewel 15, 2018


Pre Op Diagnosis:  


Closed right displaced subcapital femoral neck fracture


Post Op Diagnosis:  


Same


Surgeon:


Krista Taylor


Assistant(s):


None


Procedure:


Right hip hemiarthroplasty


Complications:


None


Specimen(s) removed:


None


Estimated blood loss:


125 cc


Anesthesia:  General


Drains:  None


IVF


Patient to:  PACU


Patient Condition:  Good


Implant/Devices:  SEE IMPLANT LOG (if applicable)


Date/Time of Procedure:  SEE SURGICAL CARE RECORD











Krista Taylor MD Jewel 15, 2018 15:21

## 2018-06-16 VITALS
TEMPERATURE: 97 F | RESPIRATION RATE: 16 BRPM | DIASTOLIC BLOOD PRESSURE: 53 MMHG | HEART RATE: 84 BPM | OXYGEN SATURATION: 93 % | SYSTOLIC BLOOD PRESSURE: 107 MMHG

## 2018-06-16 VITALS
SYSTOLIC BLOOD PRESSURE: 133 MMHG | HEART RATE: 77 BPM | OXYGEN SATURATION: 94 % | TEMPERATURE: 98.1 F | DIASTOLIC BLOOD PRESSURE: 60 MMHG | RESPIRATION RATE: 18 BRPM

## 2018-06-16 VITALS
DIASTOLIC BLOOD PRESSURE: 68 MMHG | SYSTOLIC BLOOD PRESSURE: 154 MMHG | OXYGEN SATURATION: 92 % | HEART RATE: 100 BPM | TEMPERATURE: 97.6 F | RESPIRATION RATE: 17 BRPM

## 2018-06-16 VITALS
RESPIRATION RATE: 20 BRPM | HEART RATE: 87 BPM | SYSTOLIC BLOOD PRESSURE: 123 MMHG | TEMPERATURE: 98.3 F | OXYGEN SATURATION: 95 % | DIASTOLIC BLOOD PRESSURE: 58 MMHG

## 2018-06-16 VITALS
DIASTOLIC BLOOD PRESSURE: 68 MMHG | SYSTOLIC BLOOD PRESSURE: 166 MMHG | TEMPERATURE: 97.3 F | OXYGEN SATURATION: 98 % | RESPIRATION RATE: 16 BRPM | HEART RATE: 82 BPM

## 2018-06-16 VITALS
DIASTOLIC BLOOD PRESSURE: 71 MMHG | TEMPERATURE: 98.5 F | RESPIRATION RATE: 16 BRPM | SYSTOLIC BLOOD PRESSURE: 179 MMHG | HEART RATE: 94 BPM | OXYGEN SATURATION: 92 %

## 2018-06-16 RX ADMIN — PHENYTOIN SODIUM SCH MLS/HR: 50 INJECTION INTRAMUSCULAR; INTRAVENOUS at 21:13

## 2018-06-16 RX ADMIN — MULTIPLE VITAMINS W/ MINERALS TAB SCH TAB: TAB at 21:32

## 2018-06-16 RX ADMIN — STANDARDIZED SENNA CONCENTRATE AND DOCUSATE SODIUM SCH TAB: 8.6; 5 TABLET, FILM COATED ORAL at 21:32

## 2018-06-16 RX ADMIN — PHENYTOIN SODIUM SCH MLS/HR: 50 INJECTION INTRAMUSCULAR; INTRAVENOUS at 00:52

## 2018-06-16 RX ADMIN — LEVOTHYROXINE SODIUM SCH MCG: 75 TABLET ORAL at 05:14

## 2018-06-16 RX ADMIN — ENOXAPARIN SODIUM SCH MG: 40 INJECTION SUBCUTANEOUS at 05:15

## 2018-06-16 RX ADMIN — VERAPAMIL HYDROCHLORIDE SCH MG: 240 TABLET, FILM COATED, EXTENDED RELEASE ORAL at 21:31

## 2018-06-16 RX ADMIN — STANDARDIZED SENNA CONCENTRATE AND DOCUSATE SODIUM SCH TAB: 8.6; 5 TABLET, FILM COATED ORAL at 08:46

## 2018-06-16 RX ADMIN — HYDROCODONE BITARTRATE AND ACETAMINOPHEN PRN TAB: 5; 325 TABLET ORAL at 05:14

## 2018-06-16 RX ADMIN — Medication SCH ML: at 08:46

## 2018-06-16 RX ADMIN — HYDROCODONE BITARTRATE AND ACETAMINOPHEN PRN TAB: 5; 325 TABLET ORAL at 21:32

## 2018-06-16 RX ADMIN — Medication SCH ML: at 21:31

## 2018-06-16 NOTE — PD.OP
cc:   Krista Taylor MD


__________________________________________________





Operative Report


Date of Surgery:  Jewel 15, 2018


Preoperative Diagnosis:  


Closed right subcapital displaced femoral neck fracture


Postoperative Diagnosis:  


Same


Procedure:


Right hip hemiarthroplasty


Anesthesia:


General


Surgeon:


Krista Taylor


Assistant(s):


None


Operation and Findings:


EBL: 125 cc


Complications: None


Specimens: None





Indications for procedure: Patient is a 88-year-old female who presented to the 

hospital after mechanical trip and fall with acute onset of right hip pain and 

inability to bear weight.  Patient was found to have a right displaced 

subcapital femoral neck fracture.  Recommendation for right hip 

hemiarthroplasty.  Risks of surgery including but not limited to: Infection, 

periprosthetic fracture, hardware malposition or failure, persistent hip pain 

and/or stiffness, hip weakness, hip instability and/or dislocation, 

neurovascular injury, possible need for further surgery, limb length discrepancy

, and other unforeseen complications were all discussed with the patient.  At 

this time she is agreed to the above-mentioned procedure.





Description of procedure: Patient was brought back to the operating room where 

general anesthesia then ensued.  Patient was carefully positioned on the 

operating room table with her right hip up in a lateral position on a pegboard.

  All bony prominences were well padded and an axillary roll was placed.  

Patient was prepped and draped in standard sterile fashion.  Preoperative 

antibiotics were given within 1 hour of incision.  A timeout was performed to 

identify the correct patient, side, site and procedure to be performed. An 

approximately 5-6 inch incision was made over the greater trochanter with sharp 

dissection through the skin and subcutaneous tissue down to fascia.  Hemostasis 

was then achieved with electrocautery.  The fascia was then incised in line 

with the skin incision.  A Charnley retractor was then placed.  The anterior 

third of the gluteus medius was then elevated subperiosteally off the greater 

trochanter.  This allowed access to the anterior hip capsule which was then 

incised.  The displaced subcapital femoral neck fracture was immediately 

encountered.  The lesser trochanter was identified and a femoral neck cut was 

made approximately 1 cm above the lesser trochanter.  The femoral head was then 

removed and sized on the back table.  The leg was then externally rotated and 

flexed to allow access to the femoral canal. The femur was subsequently reamed 

and broached.  The femur was calcar planed over the broach. A trial neck and 

head was then placed on the broach and the hip was reduced. At this time, the 

hip appeared stable with range of motion and leg length appeared essentially 

equal.  There was good shuck of the hip as well.  The hip was then dislocated 

and the trial head, neck and broach was removed.  The femur was thoroughly 

irrigated.  At this time, the final femoral stem was then inserted and was 

found to be in good position and stable.  Again, a trial head was affixed to 

the stem and the hip reduced.  Again, the hip appeared stable to range of 

motion and leg lengths appeared essentially equal.  The hip again was 

dislocated and the trial head removed.  The final bipolar head was then affixed 

to the femoral stem.  The wound was thoroughly irrigated and the hip reduced 

again.  The hip appeared stable through range of motion and length makes 

appeared essentially equal. There was good shuck of the hip as well.  The hip 

capsule was then closed with #1 Vicryl suture.  The gluteus medius was then 

repaired back to the greater trochanter through bone tunnels with #5 Tycron 

suture.  The fascia was then closed with running #1 Vicryl sutures in the 

subcutaneous tissue was closed with interrupted Vicryl sutures.  The skin was 

then closed with staples and sterile dressings were applied.  Patient was 

placed into a knee immobilizer and abduction pillow.  Patient was transferred 

off the operating room table and placed supine on hospital bed.  Patient was 

awoken from general anesthesia without complication.





Disposition: Patient will be weightbearing as tolerated to the right lower 

extremity.  Anterolateral hip precautions. No active abduction











Krista Taylor MD Jun 16, 2018 09:28

## 2018-06-16 NOTE — HHI.PR
Subjective


Remarks


Pt states her pain is controlled. denies any chest pain, SOB, nausea or 

vomiting.





Objective


Vitals





Vital Signs








  Date Time  Temp Pulse Resp B/P (MAP) Pulse Ox O2 Delivery O2 Flow Rate FiO2


 


6/16/18 08:00 97.6 100 17 154/68 (96) 92   


 


6/16/18 04:00 98.5 94 16 179/71 (107) 92   


 


6/16/18 00:00 97.3 82 16 166/68 (100) 98   


 


6/15/18 20:15     98 Nasal Cannula 2.00 


 


6/15/18 20:00 97.2 82 17 177/73 (107) 98   


 


6/15/18 16:15 97.1 82 16 159/73 (101) 97 Nasal Cannula 2 


 


6/15/18 16:00 98.0 85 16 183/74 (110) 95   


 


6/15/18 16:00 97.1 76 16 158/73 (101) 98 Nasal Cannula 2 


 


6/15/18 15:45 97.1 80 16 168/76 (106) 100 Nasal Cannula 2 


 


6/15/18 15:40 97.1 82 16 172/73 (106) 100 Nasal Cannula 2 














I/O      


 


 6/15/18 6/15/18 6/15/18 6/16/18 6/16/18 6/16/18





 07:00 15:00 23:00 07:00 15:00 23:00


 


Intake Total 1340 ml  1240 ml 1920 ml  


 


Output Total   125 ml   


 


Balance 1340 ml  1115 ml 1920 ml  


 


      


 


Intake Oral 240 ml  240 ml 320 ml  


 


IV Total 1100 ml  100 ml 1600 ml  


 


Other   900 ml   


 


Output Estimated Blood Loss   125 ml   


 


# Voids 2  2 3  


 


# Bowel Movements   2   








Result Diagram:  


6/15/18 0552                                                                   

             6/15/18 0552





Imaging





Last Impressions








Hip X-Ray 6/16/18 0000 Signed





Impressions: 





 CONCLUSION: 





 Expected changes following recent right bipolar hip arthroplasty.





  





 





  





  





 





  





 





  





 


 


Pelvis X-Ray 6/15/18 0000 Signed





Impressions: 





 CONCLUSION:





    Right-sided total hip arthroplasty.





  





 


 


Hip and Pelvis X-Ray 6/14/18 0000 Signed





Impressions: 





 CONCLUSION: 





 Displaced right femoral neck fracture





  





 





  





  





 





  





 





  





 


 


Chest X-Ray 6/14/18 0000 Signed





Impressions: 





 CONCLUSION: 





 Hyperinflation which can be seen with COPD. No infiltrate.





  





 








Objective Remarks


GENERAL: sitting up on her room chair. 


CARDIOVASCULAR: Regular rate and rhythm without murmurs 


RESPIRATORY: Breath sounds equal bilaterally. No accessory muscle use.


GASTROINTESTINAL: Abdomen soft, non-tender, nondistended. 


MUSCULOSKELETAL: No edema. dressing over right thigh. sensation intact. able to 

wiggle her toes








A/P


Problem List:  


(1) Fall


ICD Code:  W19.XXXA - Unspecified fall, initial encounter


(2) Hip fracture, right


ICD Code:  S72.001A - Fracture of unspecified part of neck of right femur, 

initial encounter for closed fracture


(3) Renal insufficiency


ICD Code:  N28.9 - Disorder of kidney and ureter, unspecified


(4) Leukocytosis


ICD Code:  D72.829 - Elevated white blood cell count, unspecified


Assessment and Plan





88-year-old female with





1.  Right Hip Fx:  secondary to above, Hip X-ray w/ displaced right femoral 

neck fracture.  Ortho following, s/p right hip hemiarthroplasty pod 1. 

analgesics/antiemetics/rehab/dvt proph per ortho recs.   


Weightbearing as tolerated right lower extremity.  Anterolateral hip 

precautions.  Knee immobilizer and abduction pillow in bed.


2.  Fall:  s/p mechanical fall


3.  Renal Insufficiency:  resolved w IVFs


4.  Leukocytosis: likely related to fall/injury.  No obvious source of infection

, CXR w/ no acute findings. 


5.  DVT Prophylaxis:  on lovenox post op


Discharge Planning


once cleared by ortho and will also need final PT recs


CM to assist w d/c planning.











Magali Shaw MD Jun 16, 2018 11:04

## 2018-06-16 NOTE — RADRPT
EXAM DATE:  6/16/2018 9:16 AM EDT

AGE/SEX:        88 years / Female



INDICATIONS:  Post Op



CLINICAL DATA:  This is the patient's subsequent encounter. Patient reports that signs and symptoms h
ave been present for 2 days and indicates a pain score of 1/10. 

                                                                          

MEDICAL/SURGICAL HISTORY:       Arthritis. Tonsillectomy.  Hysterectomy.



COMPARISON:      [facility] [place], HIP RIGHT (AP&LAT 2/3VWS) W AP PELVIS, 6/14/2018.  . 





FINDINGS:  

2 views of the right hip joint demonstrate changes related to right bipolar hip arthroplasty. Hardwar
e is in place and demonstrates no abnormality. Skin staples are present and there is soft tissue air,
 as expected. No concerning finding is identified.



CONCLUSION: 

Expected changes following recent right bipolar hip arthroplasty.



 







Electronically signed by: Duane Fenton MD  6/16/2018 9:24 AM EDT

## 2018-06-16 NOTE — PD.ORT.PN
Subjective


Post Op Day #:  1


Subjective Remarks


pain with movement





Objective


Vitals





Vital Signs








  Date Time  Temp Pulse Resp B/P (MAP) Pulse Ox O2 Delivery O2 Flow Rate FiO2


 


6/16/18 04:00 98.5 94 16 179/71 (107) 92   


 


6/16/18 00:00 97.3 82 16 166/68 (100) 98   


 


6/15/18 20:15     98 Nasal Cannula 2.00 


 


6/15/18 20:00 97.2 82 17 177/73 (107) 98   


 


6/15/18 16:15 97.1 82 16 159/73 (101) 97 Nasal Cannula 2 


 


6/15/18 16:00 98.0 85 16 183/74 (110) 95   


 


6/15/18 16:00 97.1 76 16 158/73 (101) 98 Nasal Cannula 2 


 


6/15/18 15:45 97.1 80 16 168/76 (106) 100 Nasal Cannula 2 


 


6/15/18 15:40 97.1 82 16 172/73 (106) 100 Nasal Cannula 2 














I/O      


 


 6/15/18 6/15/18 6/15/18 6/16/18 6/16/18 6/16/18





 07:00 15:00 23:00 07:00 15:00 23:00


 


Intake Total 1340 ml  1240 ml 1920 ml  


 


Output Total   125 ml   


 


Balance 1340 ml  1115 ml 1920 ml  


 


      


 


Intake Oral 240 ml  240 ml 320 ml  


 


IV Total 1100 ml  100 ml 1600 ml  


 


Other   900 ml   


 


Output Estimated Blood Loss   125 ml   


 


# Voids 2  2 3  


 


# Bowel Movements   2   








Result Diagram:  


6/15/18 0552                                                                   

             6/15/18 0552





Imaging





Last 24 hours Impressions








Pelvis X-Ray 6/15/18 0000 Signed





Impressions: 





 CONCLUSION:





    Right-sided total hip arthroplasty.





  





 








Objective Remarks


Right lower extremity: Dressing in place.  Neurovascularly intact distally.  

Negative Homans.  Brisk cap refill. RLE externally rotated, pain with motion.





Assessment & Plan


Ortho Post Op Day #:  1


Problem List:  


Assessment and Plan


88-year-old female, POD#1 s/p R hip hemiarthroplasty


will repeat xray R hip


1.  Weightbearing as tolerated right lower extremity.  Anterolateral hip 

precautions.  Knee immobilizer and abduction pillow in bed.


2.  Physical therapy for mobilization.


3.  Lovenox for DVT prophylaxis


4.  Likely will require placement upon discharge.  Follow-up in with Dr. Taylor 

in 2 weeks.











Scar Dumont Jun 16, 2018 08:37

## 2018-06-17 VITALS
OXYGEN SATURATION: 95 % | SYSTOLIC BLOOD PRESSURE: 131 MMHG | TEMPERATURE: 97.8 F | RESPIRATION RATE: 18 BRPM | DIASTOLIC BLOOD PRESSURE: 61 MMHG | HEART RATE: 87 BPM

## 2018-06-17 VITALS
SYSTOLIC BLOOD PRESSURE: 141 MMHG | TEMPERATURE: 98.3 F | HEART RATE: 78 BPM | DIASTOLIC BLOOD PRESSURE: 59 MMHG | OXYGEN SATURATION: 92 % | RESPIRATION RATE: 18 BRPM

## 2018-06-17 VITALS
SYSTOLIC BLOOD PRESSURE: 130 MMHG | TEMPERATURE: 98.1 F | RESPIRATION RATE: 16 BRPM | HEART RATE: 82 BPM | OXYGEN SATURATION: 95 % | DIASTOLIC BLOOD PRESSURE: 61 MMHG

## 2018-06-17 LAB
BASOPHILS # BLD AUTO: 0 TH/MM3 (ref 0–0.2)
BASOPHILS NFR BLD: 0.1 % (ref 0–2)
BUN SERPL-MCNC: 21 MG/DL (ref 7–18)
CALCIUM SERPL-MCNC: 9.6 MG/DL (ref 8.5–10.1)
CHLORIDE SERPL-SCNC: 108 MEQ/L (ref 98–107)
CREAT SERPL-MCNC: 0.68 MG/DL (ref 0.5–1)
EOSINOPHIL # BLD: 0 TH/MM3 (ref 0–0.4)
EOSINOPHIL NFR BLD: 0.1 % (ref 0–4)
ERYTHROCYTE [DISTWIDTH] IN BLOOD BY AUTOMATED COUNT: 13.3 % (ref 11.6–17.2)
GFR SERPLBLD BASED ON 1.73 SQ M-ARVRAT: 82 ML/MIN (ref 89–?)
GLUCOSE SERPL-MCNC: 99 MG/DL (ref 74–106)
HCO3 BLD-SCNC: 24.4 MEQ/L (ref 21–32)
HCT VFR BLD CALC: 29.5 % (ref 35–46)
HGB BLD-MCNC: 9.8 GM/DL (ref 11.6–15.3)
LYMPHOCYTES # BLD AUTO: 0.7 TH/MM3 (ref 1–4.8)
LYMPHOCYTES NFR BLD AUTO: 5.2 % (ref 9–44)
MAGNESIUM SERPL-MCNC: 2.3 MG/DL (ref 1.5–2.5)
MCH RBC QN AUTO: 29.6 PG (ref 27–34)
MCHC RBC AUTO-ENTMCNC: 33.4 % (ref 32–36)
MCV RBC AUTO: 88.5 FL (ref 80–100)
MONOCYTE #: 1.2 TH/MM3 (ref 0–0.9)
MONOCYTES NFR BLD: 8.5 % (ref 0–8)
NEUTROPHILS # BLD AUTO: 12 TH/MM3 (ref 1.8–7.7)
NEUTROPHILS NFR BLD AUTO: 86.1 % (ref 16–70)
PLATELET # BLD: 193 TH/MM3 (ref 150–450)
PMV BLD AUTO: 8.5 FL (ref 7–11)
RBC # BLD AUTO: 3.33 MIL/MM3 (ref 4–5.3)
SODIUM SERPL-SCNC: 140 MEQ/L (ref 136–145)
WBC # BLD AUTO: 13.9 TH/MM3 (ref 4–11)

## 2018-06-17 RX ADMIN — MULTIPLE VITAMINS W/ MINERALS TAB SCH TAB: TAB at 08:52

## 2018-06-17 RX ADMIN — PHENYTOIN SODIUM SCH MLS/HR: 50 INJECTION INTRAMUSCULAR; INTRAVENOUS at 07:13

## 2018-06-17 RX ADMIN — HYDROCODONE BITARTRATE AND ACETAMINOPHEN PRN TAB: 5; 325 TABLET ORAL at 10:33

## 2018-06-17 RX ADMIN — STANDARDIZED SENNA CONCENTRATE AND DOCUSATE SODIUM SCH TAB: 8.6; 5 TABLET, FILM COATED ORAL at 08:52

## 2018-06-17 RX ADMIN — ENOXAPARIN SODIUM SCH MG: 40 INJECTION SUBCUTANEOUS at 04:22

## 2018-06-17 RX ADMIN — HYDROCODONE BITARTRATE AND ACETAMINOPHEN PRN TAB: 5; 325 TABLET ORAL at 06:06

## 2018-06-17 RX ADMIN — Medication SCH ML: at 08:52

## 2018-06-17 RX ADMIN — LEVOTHYROXINE SODIUM SCH MCG: 75 TABLET ORAL at 06:03

## 2018-06-17 NOTE — HHI.DS
__________________________________________________





Discharge Summary


Admission Date


Jun 14, 2018 at 19:14


Discharge Date:  Jun 17, 2018


Admitting Diagnosis





R femoral neck fx





(1) Fall


ICD Code:  W19.XXXA - Unspecified fall, initial encounter


(2) Hip fracture, right


ICD Code:  S72.001A - Fracture of unspecified part of neck of right femur, 

initial encounter for closed fracture


(3) Renal insufficiency


ICD Code:  N28.9 - Disorder of kidney and ureter, unspecified


(4) Leukocytosis


ICD Code:  D72.829 - Elevated white blood cell count, unspecified


Procedures


s/p right hip hemiarthroplasty


Brief History - From Admission


This is an 88-year-old female with a PMH of HTN, Hyperlipidemia and 

Hypothyroidism was brought to the ER by EMS secondary to complaints of right 

hip pain after a fall.  Patient states that she went to lock her car and 

stepped back at which time she lost her balance and fell onto the ground.  

Denies LOC or head trauma.  Reports significant pain at right hip, constant, 

severe, 10/10, nonradiating, worse with movement.  States  Morphine did improve 

symptoms.  On arrival, /68, HR 76, O2 sat 98% on RA, Afebrile.  WBC 19.4.

  Creatinine 1.18, previously 0.84 on 3/9/2018.  INR 0.9.  CXR with 

hyperinflation due to COPD, no infiltrate.  Hip X-ray displaced right femoral 

neck fracture.


CBC/BMP:  


6/17/18 0524                                                                   

             6/17/18 0524





Significant Findings





Laboratory Tests








Test


  6/14/18


17:31 6/15/18


01:10 6/15/18


05:52 6/17/18


05:24


 


White Blood Count


  19.4 TH/MM3


(4.0-11.0) 


  15.8 TH/MM3


(4.0-11.0) 13.9 TH/MM3


(4.0-11.0)


 


Neutrophils (%) (Auto)


  90.1 %


(16.0-70.0) 


  88.1 %


(16.0-70.0) 86.1 %


(16.0-70.0)


 


Lymphocytes (%) (Auto)


  3.1 %


(9.0-44.0) 


  4.2 %


(9.0-44.0) 5.2 %


(9.0-44.0)


 


Neutrophils # (Auto)


  17.4 TH/MM3


(1.8-7.7) 


  13.9 TH/MM3


(1.8-7.7) 12.0 TH/MM3


(1.8-7.7)


 


Lymphocytes # (Auto)


  0.6 TH/MM3


(1.0-4.8) 


  0.7 TH/MM3


(1.0-4.8) 0.7 TH/MM3


(1.0-4.8)


 


Monocytes # (Auto)


  1.3 TH/MM3


(0-0.9) 


  1.2 TH/MM3


(0-0.9) 1.2 TH/MM3


(0-0.9)


 


Prothrombin Time


  9.5 SEC


(9.8-11.6) 


  


  


 


 


Activated Partial


Thromboplast Time 22.6 SEC


(24.3-30.1) 


  


  


 


 


Blood Urea Nitrogen


  25 MG/DL


(7-18) 


  19 MG/DL


(7-18) 21 MG/DL


(7-18)


 


Creatinine


  1.18 MG/DL


(0.50-1.00) 


  


  


 


 


Random Glucose


  152 MG/DL


() 


  112 MG/DL


() 


 


 


Calcium Level


  11.2 MG/DL


(8.5-10.1) 


  


  


 


 


Chloride Level


  109 MEQ/L


() 


  109 MEQ/L


() 108 MEQ/L


()


 


Estimat Glomerular Filtration


Rate 43 ML/MIN


(>89) 


  73 ML/MIN


(>89) 82 ML/MIN


(>89)


 


Urine Protein


  


  30 mg/dL


(NEG-TRACE) 


  


 


 


Urine Ketones


  


  TRACE mg/dL


(NEG) 


  


 


 


Red Blood Count


  


  


  3.99 MIL/MM3


(4.00-5.30) 3.33 MIL/MM3


(4.00-5.30)


 


Total Protein


  


  


  6.1 GM/DL


(6.4-8.2) 


 


 


Albumin


  


  


  3.1 GM/DL


(3.4-5.0) 


 


 


Aspartate Amino Transf


(AST/SGOT) 


  


  14 U/L (15-37) 


  


 


 


Hemoglobin


  


  


  


  9.8 GM/DL


(11.6-15.3)


 


Hematocrit


  


  


  


  29.5 %


(35.0-46.0)


 


Monocytes (%) (Auto)


  


  


  


  8.5 %


(0.0-8.0)








Imaging





Last Impressions








Hip X-Ray 6/16/18 0000 Signed





Impressions: 





 CONCLUSION: 





 Expected changes following recent right bipolar hip arthroplasty.





  





 





  





  





 





  





 





  





 


 


Pelvis X-Ray 6/15/18 0000 Signed





Impressions: 





 CONCLUSION:





    Right-sided total hip arthroplasty.





  





 


 


Hip and Pelvis X-Ray 6/14/18 0000 Signed





Impressions: 





 CONCLUSION: 





 Displaced right femoral neck fracture





  





 





  





  





 





  





 





  





 


 


Chest X-Ray 6/14/18 0000 Signed





Impressions: 





 CONCLUSION: 





 Hyperinflation which can be seen with COPD. No infiltrate.





  





 








PE at Discharge


GENERAL: sitting up on her room chair. 


CARDIOVASCULAR: Regular rate and rhythm without murmurs 


RESPIRATORY: Breath sounds equal bilaterally. No accessory muscle use.


GASTROINTESTINAL: Abdomen soft, non-tender, nondistended. 


MUSCULOSKELETAL: No edema. dressing over right thigh. sensation intact. able to 

wiggle her toes





Pt update on day of discharge


Pt complains of pain but had just asked for her pain meds. states that the pain 

meds help a lot. denies any CP/SOB/N/V


Hospital Course


88-year-old female with





1.  Right Hip Fx:  secondary to above, Hip X-ray w/ displaced right femoral 

neck fracture.  Ortho following, s/p right hip hemiarthroplasty pod 2. 

analgesics/antiemetics/rehab/dvt proph per ortho recs.   


Weightbearing as tolerated right lower extremity.  Anterolateral hip 

precautions.  Knee immobilizer and abduction pillow in bed.


2.  Fall:  s/p mechanical fall


3.  Renal Insufficiency:  resolved w IVFs


4.  Leukocytosis: likely related to fall/injury.  No obvious source of infection

, CXR w/ no acute findings. 


5.  DVT Prophylaxis:  on lovenox post op


Pt Condition on Discharge:  Stable


Discharge Disposition:  Rehab Inpatient


Discharge Time:  > 30 minutes


Discharge Instructions


DIET: Follow Instructions for:  Heart Healthy Diet


Activities you can perform:  See Additionl Instruction


Other Activity Instructions:  


Weightbearing as tolerated right lower extremity.  Anterolateral hip 


precautions.  Knee immobilizer and abduction pillow in bed.


Follow up Referrals:  


Orthopedics - 2 Weeks [facility] with Krista Taylor MD


PCP Follow-up - 1 Week





New Medications:  


Enoxaparin Inj (Lovenox Inj) 40 Mg/0.4 Ml Syr


40 MG SQ Q24H, #14 INJECTION





Hydrocodone/Acetaminophen (Hydrocodone-Acetamin 5-325 mg) 5 Mg-325 Mg Tablet


1 TAB PO Q4H PRN for PAIN SCALE 3 TO 5, #20





 


Continued Medications:  


Cholecalciferol (Vitamin D-1000) 1,000 Unit Tab


5000 UNITS PO DAILY for Nutritional Supplement, #1 BOTTLE 0 Refills





Ibuprofen (Ibuprofen) 400 Mg Tab


400 MG PO TID for Arthritis Pain, TAB 0 Refills





Levothyroxine (Levothyroxine) 75 Mcg Tab


75 MCG PO DAILY for Thyroid, #30 TAB 0 Refills





Verapamil ER 24 HR (Verapamil ER 24 HR) 240 Mg Tab


240 MG PO HS, #30 TAB 0 Refills

















Magali Shaw MD Jun 17, 2018 10:55

## 2018-06-17 NOTE — PD.ORT.PN
Subjective


Post Op Day #:  2


Subjective Remarks


pain better.





Objective


Vitals





Vital Signs








  Date Time  Temp Pulse Resp B/P (MAP) Pulse Ox O2 Delivery O2 Flow Rate FiO2


 


6/17/18 10:12 98.3 78 18 141/59 (86) 92   


 


6/17/18 07:15     95 Room Air  


 


6/17/18 00:00 98.1 82 16 130/61 (84) 95   


 


6/16/18 20:32     89 Nasal Cannula 1.00 


 


6/16/18 20:00 98.3 87 20 123/58 (79) 95   


 


6/16/18 16:00 98.1 77 18 133/60 (84) 94   


 


6/16/18 12:00 97.0 84 16 107/53 (71) 93   














I/O      


 


 6/16/18 6/16/18 6/16/18 6/17/18 6/17/18 6/17/18





 07:00 15:00 23:00 07:00 15:00 23:00


 


Intake Total 1920 ml   500 ml  


 


Balance 1920 ml   500 ml  


 


      


 


Intake Oral 320 ml   500 ml  


 


IV Total 1600 ml     


 


# Voids 3   1  








Result Diagram:  


6/17/18 0524                                                                   

             6/17/18 0524





Imaging





Last 24 hours Impressions








Pelvis X-Ray 6/15/18 0000 Signed





Impressions: 





 CONCLUSION:





    Right-sided total hip arthroplasty.





  





 








Objective Remarks


sitting in chair, nad


Right lower extremity: Dressing in place.  Neurovascularly intact distally.  

Negative Homans.  Brisk cap refill.





Assessment & Plan


Ortho Post Op Day #:  2


Problem List:  


Assessment and Plan


88-year-old female, POD#2 s/p R hip hemiarthroplasty


xray - well positioned bipolar hemiarthroplasty.  no dislocation


1.  Weightbearing as tolerated right lower extremity.  Anterolateral hip 

precautions.  Knee immobilizer and abduction pillow in bed.


2.  Physical therapy for mobilization.


3.  Lovenox for DVT prophylaxis


4.  Likely will require placement upon discharge. ortho cleared for d/c to snf. 

Follow-up in with Dr. Taylor in 2 weeks.











Scar Dumont Jun 17, 2018 10:47